# Patient Record
Sex: FEMALE | Race: WHITE | Employment: FULL TIME | URBAN - METROPOLITAN AREA
[De-identification: names, ages, dates, MRNs, and addresses within clinical notes are randomized per-mention and may not be internally consistent; named-entity substitution may affect disease eponyms.]

---

## 2017-03-13 ENCOUNTER — OFFICE VISIT (OUTPATIENT)
Dept: FAMILY MEDICINE CLINIC | Age: 37
End: 2017-03-13
Payer: COMMERCIAL

## 2017-03-13 VITALS
OXYGEN SATURATION: 99 % | WEIGHT: 155 LBS | BODY MASS INDEX: 21.62 KG/M2 | HEART RATE: 58 BPM | SYSTOLIC BLOOD PRESSURE: 100 MMHG | DIASTOLIC BLOOD PRESSURE: 70 MMHG

## 2017-03-13 DIAGNOSIS — R63.5 WEIGHT GAIN: ICD-10-CM

## 2017-03-13 DIAGNOSIS — M25.562 ACUTE PAIN OF LEFT KNEE: Primary | ICD-10-CM

## 2017-03-13 PROCEDURE — 99213 OFFICE O/P EST LOW 20 MIN: CPT | Performed by: FAMILY MEDICINE

## 2017-03-16 LAB
ALBUMIN SERPL-MCNC: 4.6 G/DL
ALP BLD-CCNC: 58 U/L
ALT SERPL-CCNC: 19 U/L
AST SERPL-CCNC: 16 U/L
BASOPHILS ABSOLUTE: 0.1 /ΜL
BASOPHILS RELATIVE PERCENT: 1.3 %
BILIRUB SERPL-MCNC: 0.9 MG/DL (ref 0.1–1.4)
BUN BLDV-MCNC: 16 MG/DL
CALCIUM SERPL-MCNC: 9.2 MG/DL
CHLORIDE BLD-SCNC: 102 MMOL/L
CHOLESTEROL, TOTAL: 152 MG/DL
CHOLESTEROL/HDL RATIO: 2.9
CO2: 30 MMOL/L
CREAT SERPL-MCNC: 0.72 MG/DL
EOSINOPHILS ABSOLUTE: 0.1 /ΜL
EOSINOPHILS RELATIVE PERCENT: 1.9 %
GFR CALCULATED: >60
GLUCOSE BLD-MCNC: 94 MG/DL
HCT VFR BLD CALC: 37.1 % (ref 36–46)
HDLC SERPL-MCNC: 53 MG/DL (ref 35–70)
HEMOGLOBIN: 12.7 G/DL (ref 12–16)
LDL CHOLESTEROL CALCULATED: 94 MG/DL (ref 0–160)
LYMPHOCYTES ABSOLUTE: 1.3 /ΜL
LYMPHOCYTES RELATIVE PERCENT: 28 %
MCH RBC QN AUTO: 30.2 PG
MCHC RBC AUTO-ENTMCNC: 34.3 G/DL
MCV RBC AUTO: 88 FL
MONOCYTES ABSOLUTE: 0.5 /ΜL
MONOCYTES RELATIVE PERCENT: 10.7 %
NEUTROPHILS ABSOLUTE: 2.8 /ΜL
NEUTROPHILS RELATIVE PERCENT: 58.1 %
PDW BLD-RTO: 12.8 %
PLATELET # BLD: 203 K/ΜL
PMV BLD AUTO: 8.9 FL
POTASSIUM SERPL-SCNC: 4 MMOL/L
RBC # BLD: 4.22 10^6/ΜL
SODIUM BLD-SCNC: 140 MMOL/L
TOTAL PROTEIN: 7.5
TRIGL SERPL-MCNC: 26 MG/DL
TSH SERPL DL<=0.05 MIU/L-ACNC: 1.06 UIU/ML
VLDLC SERPL CALC-MCNC: 5 MG/DL
WBC # BLD: 4.8 10^3/ML

## 2017-03-20 DIAGNOSIS — R63.5 WEIGHT GAIN: ICD-10-CM

## 2018-02-14 ENCOUNTER — OFFICE VISIT (OUTPATIENT)
Dept: FAMILY MEDICINE CLINIC | Age: 38
End: 2018-02-14
Payer: COMMERCIAL

## 2018-02-14 VITALS
HEART RATE: 65 BPM | OXYGEN SATURATION: 99 % | BODY MASS INDEX: 22.07 KG/M2 | SYSTOLIC BLOOD PRESSURE: 110 MMHG | WEIGHT: 158.25 LBS | DIASTOLIC BLOOD PRESSURE: 64 MMHG

## 2018-02-14 DIAGNOSIS — K58.2 IRRITABLE BOWEL SYNDROME WITH BOTH CONSTIPATION AND DIARRHEA: ICD-10-CM

## 2018-02-14 DIAGNOSIS — M53.3 SACRAL PAIN: Primary | ICD-10-CM

## 2018-02-14 PROCEDURE — 99214 OFFICE O/P EST MOD 30 MIN: CPT | Performed by: FAMILY MEDICINE

## 2018-02-14 RX ORDER — BACLOFEN 10 MG/1
10 TABLET ORAL 3 TIMES DAILY
Qty: 30 TABLET | Refills: 0 | Status: SHIPPED | OUTPATIENT
Start: 2018-02-14 | End: 2021-06-02

## 2018-02-14 NOTE — PROGRESS NOTES
Subjective:  Burt Barragan presents for   Chief Complaint   Patient presents with   [facility] on ice    Lower Back Pain     since sunday. has applied ice. discomfort constantly. No loc. Is sore. motirn 800mg bid    Irregular bowels  Gassy    Bowels go from constiopation to diarrhea    This has been going on for years    Patient Active Problem List   Diagnosis    Raynaud's disease without gangrene       Review of Systems:  · General: no significant weight changes. · Respiratory: no cough, pleuritic chest pain, dyspnea, or wheezing  · Cardiovascular: no pain, JONES, orthopnea, palpitations, or claudication  · Gastrointestinal: no chronic nausea, vomiting, heartburn, diarrhea, constipation, bloating, or abdominal pain. No bloody or black stools. Objective:  Physical Exam   Vitals:   Vitals:    02/14/18 1453   BP: 110/64   Pulse: 65   SpO2: 99%   Weight: 158 lb 4 oz (71.8 kg)     Wt Readings from Last 3 Encounters:   02/14/18 158 lb 4 oz (71.8 kg)   03/13/17 155 lb (70.3 kg)   08/15/16 151 lb (68.5 kg)     Ht Readings from Last 3 Encounters:   12/10/15 5' 11\" (1.803 m)   07/28/15 6' (1.829 m)   04/07/15 6' (1.829 m)     Body mass index is 22.07 kg/m². Constitutional: She is oriented to person, place, and time. She appears well-developed and well-nourished and in no acute distress. Answers all my questions appropriately. Head: Normocephalic and atraumatic. Eyes:conjunctiva appear normal.  Nose: pink, non-edematous mucosa. No polyps. No septal deviation  Throat: no erythema, tonsillar hypertrophy or exudate. No ulcerations noted. Lips/Teeth/Gums all appear normal.  Neck: Normal range of motion. Neck supple. No tracheal deviation present. No abnormal lymphadenopathy. No JVD noted. Carotids are clear bilaterally. No thyroid masses noted. Abdomen: No distension noted.  + bowel sounds in all quadrants which are normoactive. No bruits noted. No masses could be palpated.   No unusual

## 2018-02-14 NOTE — PROGRESS NOTES
Visit Information    Have you changed or started any medications since your last visit including any over-the-counter medicines, vitamins, or herbal medicines? no   Have you stopped taking any of your medications? Is so, why? -  no  Are you having any side effects from any of your medications? - no    Have you seen any other physician or provider since your last visit?  no   Have you had any other diagnostic tests since your last visit?  no   Have you been seen in the emergency room and/or had an admission in a hospital since we last saw you?  no   Have you had your routine dental cleaning in the past 6 months? No    Do you have an active MyChart account? If no, what is the barrier?   Yes    Patient Care Team:  Shona Flores MD as PCP - General (Family Medicine)    Medical History Review  Past Medical, Family, and Social History reviewed and  contribute to the patient presenting condition    Health Maintenance   Topic Date Due    HIV screen  07/23/1994    DTaP/Tdap/Td vaccine (1 - Tdap) 07/23/1998    Cervical cancer screen  07/23/2000    Flu vaccine (1) 09/01/2017

## 2018-02-14 NOTE — PATIENT INSTRUCTIONS
slowly. Try to make mealtime relaxing. · Find ways to reduce stress. · Get at least 30 minutes of exercise on most days of the week. Exercise can help reduce tension and prevent constipation. Walking is a good choice. You also may want to do other activities, such as running, swimming, cycling, or playing tennis or team sports. When should you call for help? Call your doctor now or seek immediate medical care if:  ? · Your pain is different than usual or occurs with fever. ? · You lose weight without trying, or you lose your appetite and you do not know why.   ? · Your symptoms often wake you from sleep. ? · Your stools are black and tarlike or have streaks of blood. ? Watch closely for changes in your health, and be sure to contact your doctor if:  ? · Your IBS symptoms get worse or begin to disrupt your day-to-day life. ? · You become more tired than usual.   ? · Your home treatment stops working. Where can you learn more? Go to https://Identropy.TYMR. org and sign in to your [employer] account. Enter G665 in the [employer] box to learn more about \"Irritable Bowel Syndrome: Care Instructions. \"     If you do not have an account, please click on the \"Sign Up Now\" link. Current as of: May 12, 2017  Content Version: 11.5  © 6139-7949 [employer]. Care instructions adapted under license by [facility] ([facility]). If you have questions about a medical condition or this instruction, always ask your healthcare professional. [place] [address] any warranty or liability for your use of this information. Patient Education        Diet for Irritable Bowel Syndrome: Care Instructions  Your Care Instructions    Irritable bowel syndrome, or IBS, is a problem with the intestines. IBS can cause belly pain, bloating, gas, constipation, and diarrhea. Most people can control their symptoms by changing their diet and easing stress.   No specific foods cause everyone with drinks high in sugar, especially fruit juice, soda, candy, and other packaged sweets (such as cookies)  · Foods high in fat, including miner, sausage, butter, oils, and anything deep-fried  · Sorbitol and xylitol, artificial sweeteners found in some sugarless candies and chewing gum  Keep track of foods  · Some people with IBS use a daily food diary to keep track of what they eat and whether they have any symptoms after eating certain foods. The diary also can be a good way to record what is going on in your life. · Stress plays a role in IBS. So if you are aware that certain stresses bring on symptoms, you can try to reduce those stresses. Keep mealtimes pleasant  · Try to maintain a pleasant environment when you eat. This may reduce stress that can make symptoms likely to occur. · Give yourself plenty of time to eat, rather than eating on the go. Chew your food slowly. Try not to swallow air, which can cause bloating. Where can you learn more? Go to https://[employer].Vysr. org and sign in to your [facility] account. Enter Z773 in the InMyShow box to learn more about \"Diet for Irritable Bowel Syndrome: Care Instructions. \"     If you do not have an account, please click on the \"Sign Up Now\" link. Current as of: May 12, 2017  Content Version: 11.5  © 1712-9993 [employer]. Care instructions adapted under license by [facility] [place] ([facility]). If you have questions about a medical condition or this instruction, always ask your healthcare professional. [place] [address] any warranty or liability for your use of this information.

## 2021-04-23 ENCOUNTER — TELEPHONE (OUTPATIENT)
Dept: FAMILY MEDICINE CLINIC | Age: 41
End: 2021-04-23

## 2021-06-02 ENCOUNTER — OFFICE VISIT (OUTPATIENT)
Dept: FAMILY MEDICINE CLINIC | Age: 41
End: 2021-06-02
Payer: COMMERCIAL

## 2021-06-02 VITALS
OXYGEN SATURATION: 99 % | DIASTOLIC BLOOD PRESSURE: 70 MMHG | SYSTOLIC BLOOD PRESSURE: 116 MMHG | HEIGHT: 72 IN | BODY MASS INDEX: 24.24 KG/M2 | WEIGHT: 179 LBS | HEART RATE: 76 BPM

## 2021-06-02 DIAGNOSIS — Z87.898 HISTORY OF BENZODIAZEPINE USE: ICD-10-CM

## 2021-06-02 DIAGNOSIS — M25.562 CHRONIC PAIN OF LEFT KNEE: ICD-10-CM

## 2021-06-02 DIAGNOSIS — F41.9 ANXIETY: ICD-10-CM

## 2021-06-02 DIAGNOSIS — I73.00 RAYNAUD'S DISEASE WITHOUT GANGRENE: Primary | ICD-10-CM

## 2021-06-02 DIAGNOSIS — G89.29 CHRONIC PAIN OF LEFT KNEE: ICD-10-CM

## 2021-06-02 DIAGNOSIS — Z12.31 ENCOUNTER FOR SCREENING MAMMOGRAM FOR MALIGNANT NEOPLASM OF BREAST: ICD-10-CM

## 2021-06-02 PROCEDURE — 99215 OFFICE O/P EST HI 40 MIN: CPT | Performed by: FAMILY MEDICINE

## 2021-06-02 RX ORDER — UBROGEPANT 50 MG/1
TABLET ORAL
Qty: 10 TABLET | Refills: 11 | Status: SHIPPED | OUTPATIENT
Start: 2021-06-02

## 2021-06-02 RX ORDER — ALPRAZOLAM 0.5 MG/1
0.5 TABLET ORAL 3 TIMES DAILY PRN
Qty: 30 TABLET | Refills: 1 | Status: SHIPPED | OUTPATIENT
Start: 2021-06-02 | End: 2021-07-02

## 2021-06-02 RX ORDER — NIFEDIPINE 30 MG/1
30 TABLET, EXTENDED RELEASE ORAL DAILY
Qty: 30 TABLET | Refills: 5 | Status: SHIPPED
Start: 2021-06-02 | End: 2022-04-22

## 2021-06-02 SDOH — ECONOMIC STABILITY: FOOD INSECURITY: WITHIN THE PAST 12 MONTHS, YOU WORRIED THAT YOUR FOOD WOULD RUN OUT BEFORE YOU GOT MONEY TO BUY MORE.: NEVER TRUE

## 2021-06-02 SDOH — ECONOMIC STABILITY: FOOD INSECURITY: WITHIN THE PAST 12 MONTHS, THE FOOD YOU BOUGHT JUST DIDN'T LAST AND YOU DIDN'T HAVE MONEY TO GET MORE.: NEVER TRUE

## 2021-06-02 ASSESSMENT — PATIENT HEALTH QUESTIONNAIRE - PHQ9
1. LITTLE INTEREST OR PLEASURE IN DOING THINGS: 0
SUM OF ALL RESPONSES TO PHQ QUESTIONS 1-9: 0
SUM OF ALL RESPONSES TO PHQ9 QUESTIONS 1 & 2: 0
SUM OF ALL RESPONSES TO PHQ QUESTIONS 1-9: 0
2. FEELING DOWN, DEPRESSED OR HOPELESS: 0
SUM OF ALL RESPONSES TO PHQ QUESTIONS 1-9: 0

## 2021-06-02 ASSESSMENT — SOCIAL DETERMINANTS OF HEALTH (SDOH): HOW HARD IS IT FOR YOU TO PAY FOR THE VERY BASICS LIKE FOOD, HOUSING, MEDICAL CARE, AND HEATING?: NOT HARD AT ALL

## 2021-06-02 NOTE — LETTER
CONTROLLED SUBSTANCE MEDICATION AGREEMENT     Patient Name: Nelsy Elder  Patient YOB: 1980   I understand, that controlled substance medications may be used to help better manage my symptoms and to improve my ability to function at home, work and in social settings. However, I also understand that these medications do have risks, which have been discussed with me, including possible development of physical or psychological dependence. I understand that successful treatment requires mutual trust and honesty between me and my provider. I understand and agree that following this Medication Agreement is necessary in continuing my provider-patient relationship and the success of my treatment plan. Explanation from my Provider: Benefits and Goals of Controlled Substance Medications: There are two potential goals for your treatment: (1) decreased pain and suffering (2) improved daily life functions. There are many possible treatments for your chronic condition(s). Alternatives such as physical therapy, yoga, massage, home daily exercise, meditation, relaxation techniques, injections, chiropractic manipulations, surgery, cognitive therapy, hypnosis and many medications that are not habit-forming may be used. Use of controlled substance medications may be helpful, but they are unlikely to resolve all symptoms or restore all function. Explanation from my Provider: Risks of Controlled Substance Medications:  Opioid pain medications: These medications can lead to problems such as addiction/dependence, sedation, lightheadedness/dizziness, memory issues, falls, constipation, nausea, or vomiting. They may also impair the ability to drive or operate machinery. Additionally, these medications may lower testosterone levels, leading to loss of bone strength, stamina and sex drive.   They may cause problems with breathing, sleep apnea and reduced coughing, which is especially dangerous for patients with lung disease. Overdose or dangerous interactions with alcohol and other medications may occur, leading to death. Hyperalgesia may develop, which means patients receiving opioids for the treatment of pain may become more sensitive to certain painful stimuli, and in some cases, experience pain from ordinarily non-painful stimuli. Women between the ages of 14-53 who could become pregnant should carefully weigh the risks and benefits of opioids with their physicians, as these medications increase the risk of pregnancy complications, including miscarriage,  delivery and stillbirth. It is also possible for babies to be born addicted to opioids. Opioid dependence withdrawal symptoms may include; feelings of uneasiness, increased pain, irritability, belly pain, diarrhea, sweats and goose-flesh. Benzodiazepines and non-benzodiazepine sleep medications: These medications can lead to problems such as addiction/dependence, sedation, fatigue, lightheadedness, dizziness, incoordination, falls, depression, hallucinations, and impaired judgment, memory and concentration. The ability to drive and operate machinery may also be affected. Abnormal sleep-related behaviors have been reported, including sleepwalking, driving, making telephone calls, eating, or having sex while not fully awake. These medications can suppress breathing and worsen sleep apnea, particularly when combined with alcohol or other sedating medications, potentially leading to death. Dependence withdrawal symptoms may include tremors, anxiety, hallucinations and seizures. Stimulants:  Common adverse effects include addiction/dependence, increased blood  pressure and heart rate, decreased appetite, nausea, involuntary weight loss, insomnia,                                                                                                                     Initials:_______   irritability, and headaches.   These risks may increase when these medications are combined with other stimulants, such as caffeine pills or energy drinks, certain weight loss supplements and oral decongestants. Dependence withdrawal symptoms may include depressed mood, loss of interest, suicidal thoughts, anxiety, fatigue, appetite changes and agitation. Testosterone replacement therapy:  Potential side effects include increased risk of stroke and heart attack, blood clots, increased blood pressure, increased cholesterol, enlarged prostate, sleep apnea, irritability/aggression and other mood disorders, and decreased fertility. I agree and understand that I and my prescriber have the following rights and responsibilities regarding my treatment plan:     1. MY RIGHTS:  To be informed of my treatment and medication plan. To be an active participant in my health and wellbeing. 2. MY RESPONSIBILITY AND UNDERSTANDING FOR USE OF MEDICATIONS   I will take medications at the dose and frequency as directed. For my safety, I will not increase or change how I take my medications without the recommendation of my healthcare provider.  I will actively participate in any program recommended by my provider which may improve function, including social, physical, psychological programs.  I will not take my medications with alcohol or other drugs not prescribed to me. I understand that drinking alcohol with my medications increases the chances of side effects, including reduced breathing rate and could lead to personal injury when operating machinery.  I understand that if I have a history of substance use disorders, including alcohol or other illicit drugs, that I may be at increased risk of addiction to my medications.  I agree to notify my provider immediately if I should become pregnant so that my treatment plan can be adjusted.    I agree and understand that I shall only receive controlled substance medications from the prescriber that signed this agreement unless there is [email or web address]    5. MY RESPONSIBILITY WITH ILLEGAL DRUGS    I will not use illegal or street drugs or another person's prescription medications not prescribed to me.  If there are identified addiction type symptoms, then referral to a program may be provided by my provider and I agree to follow through with this recommendation. 6. MY RESPONSIBILITY FOR COOPERATION WITH INVESTIGATIONS   I understand that my provider will comply with any applicable law and may discuss my use and/or possible misuse/abuse of controlled substances and alcohol, as appropriate, with any health care provider involved in my care, pharmacist, or legal authority.  I authorize my provider and pharmacy to cooperate fully with law enforcement agencies (as permitted by law) in the investigation of any possible misuse, sale, or other diversion of my controlled substances.  I agree to waive any applicable privilege or right of privacy or confidentiality with respect to these authorizations. 7. PROVIDERS RIGHT TO MONITOR FOR SAFETY: PRESCRIPTION MONITORING / DRUG TESTING   I consent to drug/toxicology screening and will submit to a drug screen upon my providers request to assure I am only taking the prescribed drugs for my safety monitoring. I understand that a drug screen is a laboratory test in which a sample of my urine, blood or saliva is checked to see what drugs I have been taking. This may entail an observed urine specimen, which means that a nurse or other health care provider may watch me provide urine, and I will cooperate if I am asked to provide an observed specimen.  I understand that my provider will check a copy of my State Prescription Monitoring Program () Report in order to safely prescribe medications.  Pill Counts: I consent to pill counts when requested.   I may be asked to bring all my prescribed controlled substance medications, in their original bottles, to all of my scheduled appointments. In addition, my provider may ask me to come to the practice at any time for a random pill count. 8. TERMINATION OF THIS AGREEMENT  For my safety, my prescriber has the right to stop prescribing controlled substance medications and may end this agreement. Initials:_______   Conditions that may result in termination of this agreement:  a. I do not show any improvement in pain, or my activity has not improved. b. I develop rapid tolerance or loss of improvement, as described in my treatment plan.  c. I develop significant side effects from the medication. d. My behavior is not consistent with the responsibilities outlined above, thereby causing safety concerns to continue prescribing controlled substance medications. e. I fail to follow the terms of this agreement. f. Other:____________________________       UNDERSTANDING THIS MEDICATION AGREEMENT:    I have read the above and have had all my questions answered. For chronic disease management, I know that my symptoms can be managed with many types of treatments. A chronic medication trial may be part of my treatment, but I must be an active participant in my care. Medication therapy is only one part of my symptom management plan. In some cases, there may be limited scientific evidence to support the chronic use of certain medications to improve symptoms and daily function. Furthermore, in certain circumstances, there may be scientific information that suggests that the use of chronic controlled substances may worsen my symptoms and increase my risk of unintentional death directly related to this medication therapy. I know that if my provider feels my risk from controlled medications is greater than my benefit, I will have my controlled substance medication(s) compassionately lowered or removed altogether.      I further agree to allow this office to contact my HIPAA contact if there are concerns about my safety and use of the controlled medications. I have agreed to use the prescribed controlled substance medications to me as instructed by my provider and as stated in this Medication Agreement. My initial on each page and my signature below shows that I have read each page and I have had the opportunity to ask questions with answers provided by my provider.     Patient Name (Printed): _____________________________________  Patient Signature:  ______________________   Date: _____________    Prescriber Name (Printed): ___________________________________  Prescriber Signature: _____________________  Date: _____________

## 2021-06-02 NOTE — PROGRESS NOTES
Subjective:  Delano Garcia presents for   Chief Complaint   Patient presents with    Anxiety     needs refill of xanax     Dad had MI at age of 59  has flight anxiety and that is the only tmie she uses the xanax    No exercise     Has left knee discomfort she points to the tibial tubosity. Worse with stairs  and sqauats  raynauds seems to be getting worse over time - easier to trigger. Has spider veins on her legs. Headaches aren't being helped much with the triptins. Patient Active Problem List   Diagnosis    Raynaud's disease without gangrene         Review of Systems:  · General: no significant weight changes. · Respiratory: no cough, pleuritic chest pain, dyspnea, or wheezing  · Cardiovascular: no pain, JONES, orthopnea, palpitations or claudication  · Gastrointestinal: no chronic nausea, vomiting, heartburn, diarrhea, constipation, bloating, or abdominal pain. No bloody or black stools. Objective:  Physical Exam   Vitals:   Vitals:    06/02/21 1438   BP: 116/70   Pulse: 76   SpO2: 99%   Weight: 179 lb (81.2 kg)   Height: 6' (1.829 m)     Wt Readings from Last 3 Encounters:   06/02/21 179 lb (81.2 kg)   02/14/18 158 lb 4 oz (71.8 kg)   03/13/17 155 lb (70.3 kg)     Ht Readings from Last 3 Encounters:   06/02/21 6' (1.829 m)   12/10/15 5' 11\" (1.803 m)   07/28/15 6' (1.829 m)     Body mass index is 24.28 kg/m². Constitutional: She is oriented to person, place, and time. She appears well-developed and well-nourished and in no acute distress. Answers all my questions appropriately. Head: Normocephalic and atraumatic. Eyes:conjunctiva appear normal.      Mild palpable tenderness over tibial turbosity. From. With squats she complains of increased discomfort. She does have small spider veins on her distal LE. Skin is in good condition. Hands  -  Look good. Vascular exam is wnl    Assessment:   Encounter Diagnoses   Name Primary?     History of benzodiazepine use     Anxiety     Encounter for screening mammogram for malignant neoplasm of breast     Raynaud's disease without gangrene Yes    Chronic pain of left knee          Plan:   Medications Discontinued During This Encounter   Medication Reason    rizatriptan (MAXALT-MLT) 10 MG disintegrating tablet     baclofen (LIORESAL) 10 MG tablet LIST CLEANUP    ranitidine (ZANTAC) 150 MG capsule     ALPRAZolam (XANAX) 0.5 MG tablet REORDER     THE ABOVE NOTED DISCONTINUED MEDS MAY ONLY BE FROM 'CLEANING UP' THE MED LIST AND WERE NOT ACTUALLY CANCELED;  SEE CHART FOR DETAILS! Orders Placed This Encounter   Medications    NIFEdipine (PROCARDIA XL) 30 MG extended release tablet     Sig: Take 1 tablet by mouth daily     Dispense:  30 tablet     Refill:  5    ALPRAZolam (XANAX) 0.5 MG tablet     Sig: Take 1 tablet by mouth 3 times daily as needed for Anxiety (flight) for up to 30 days. Dispense:  30 tablet     Refill:  1     Orders Placed This Encounter   Procedures    DAVID DIGITAL SCREEN W OR WO CAD BILATERAL     Standing Status:   Future     Standing Expiration Date:   6/2/2022     Order Specific Question:   Reason for exam:     Answer:   See ICDM-10 code attached   [address], PAIN     Standing Status:   Future     Standing Expiration Date:   6/2/2022    CBC Auto Differential     Standing Status:   Future     Standing Expiration Date:   6/2/2022    Comprehensive Metabolic Panel     Standing Status:   Future     Standing Expiration Date:   6/2/2022    Lipid Panel     Standing Status:   Future     Standing Expiration Date:   6/2/2022     Order Specific Question:   Is Patient Fasting?/# of Hours     Answer:   0    TSH with Reflex     Standing Status:   Future     Standing Expiration Date:   6/2/2022     Return in about 3 months (around 9/2/2021). Patient Instructions   voltaren gel 4 x a day    Data Unavailable      After the patient left, I reviewed med options for her and have prescribed ubrelvy.     Sent a [employer] e message to her on her answering machine

## 2021-06-14 DIAGNOSIS — Z87.898 HISTORY OF BENZODIAZEPINE USE: ICD-10-CM

## 2021-06-14 DIAGNOSIS — F41.9 ANXIETY: ICD-10-CM

## 2021-06-21 ENCOUNTER — APPOINTMENT (OUTPATIENT)
Dept: GENERAL RADIOLOGY | Age: 41
End: 2021-06-21
Payer: COMMERCIAL

## 2021-06-21 ENCOUNTER — HOSPITAL ENCOUNTER (EMERGENCY)
Age: 41
Discharge: HOME | End: 2021-06-21
Attending: EMERGENCY MEDICINE
Payer: COMMERCIAL

## 2021-06-21 ENCOUNTER — NURSE TRIAGE (OUTPATIENT)
Dept: OTHER | Facility: CLINIC | Age: 41
End: 2021-06-21

## 2021-06-21 VITALS
BODY MASS INDEX: 23.7 KG/M2 | TEMPERATURE: 98.6 F | OXYGEN SATURATION: 99 % | SYSTOLIC BLOOD PRESSURE: 123 MMHG | WEIGHT: 175 LBS | HEIGHT: 72 IN | HEART RATE: 68 BPM | DIASTOLIC BLOOD PRESSURE: 84 MMHG | RESPIRATION RATE: 14 BRPM

## 2021-06-21 DIAGNOSIS — S92.512A CLOSED DISPLACED FRACTURE OF PROXIMAL PHALANX OF LESSER TOE OF LEFT FOOT, INITIAL ENCOUNTER: Primary | ICD-10-CM

## 2021-06-21 PROCEDURE — 6370000000 HC RX 637 (ALT 250 FOR IP): Performed by: PHYSICIAN ASSISTANT

## 2021-06-21 PROCEDURE — 73630 X-RAY EXAM OF FOOT: CPT

## 2021-06-21 PROCEDURE — 99283 EMERGENCY DEPT VISIT LOW MDM: CPT

## 2021-06-21 RX ORDER — ACETAMINOPHEN 500 MG
1000 TABLET ORAL ONCE
Status: COMPLETED | OUTPATIENT
Start: 2021-06-21 | End: 2021-06-21

## 2021-06-21 RX ADMIN — ACETAMINOPHEN 1000 MG: 500 TABLET ORAL at 18:53

## 2021-06-21 ASSESSMENT — PAIN DESCRIPTION - DESCRIPTORS: DESCRIPTORS: THROBBING;TENDER

## 2021-06-21 ASSESSMENT — PAIN SCALES - GENERAL
PAINLEVEL_OUTOF10: 8
PAINLEVEL_OUTOF10: 8

## 2021-06-21 ASSESSMENT — PAIN DESCRIPTION - LOCATION: LOCATION: FOOT

## 2021-06-21 ASSESSMENT — PAIN DESCRIPTION - ORIENTATION: ORIENTATION: LEFT

## 2021-06-21 NOTE — ED PROVIDER NOTES
[address] [facility]  [address] [facility] [address]  Phone: [phone]  Fax: [phone]      eMERGENCY dEPARTMENT eNCOUnter      Pt Name: Autumn Wells  MRN: [id number]  [place] 1980  Date of evaluation: 6/21/21      CHIEF COMPLAINT:  Chief Complaint   Patient presents with    Foot Injury       HISTORY OF PRESENT ILLNESS    Autumn Wells is a 38 y.o. female who presents with lower leg pain:    Location/Symptom:   LEFT foot pain   Timing/Onset: 1 day  Context/Setting:   Painful/swollen/bruised left 5th toe and lateral foot after stubbing toe on her kid's toy. No paresthesias or focal weakness. RICE since then. Quality: achy, sharp  Duration: constant  Modifying Factors: worse with movement/walking, better with rest  Severity: moderate    Nursing Notes were reviewed. REVIEW OF SYSTEMS       Constitutional: Denies recent fever, chills. Eyes: No visual changes. Neck: No neck pain. Respiratory: Denies recent shortness of breath. Cardiac:  Denies recent chest pain. GI:  Denies abdominal pain/nausea/vomiting/diarrhea. : Denies dysuria. Musculoskeletal:  Per HPI  Neurologic: Denies headache or focal weakness/paresthesias  Skin:  Denies any rash. Negative in 10 essential Systems except as mentioned above and in the HPI. PAST MEDICAL HISTORY   PMH:  has no past medical history on file. Surgical History:  has a past surgical history that includes hip surgery (Right, 1/5/2016). Social History:  reports that she has never smoked. She has never used smokeless tobacco. She reports current alcohol use. She reports that she does not use drugs. Family History: None  Psychiatric History: None    Allergies:is allergic to bactrim and pcn [penicillins].       PHYSICAL EXAM     INITIAL VITALS: /84   Pulse 68   Temp 98.6 °F (37 °C) (Oral)   Resp 14   Ht 6' (1.829 m)   Wt 79.4 kg (175 lb)   SpO2 99%   BMI 23.73 kg/m²   Constitutional:  Well developed   Eyes: Pupils equal/round  HENT:  Atraumatic, external ears normal, nose normal  Respiratory:  Comfortable speech and breathing  Musculoskeletal:    Generalized mild edema of left 5th toe and ecchymosis. Little ROM due to pain. TTP of lateral mid/distal 5th MT but no edema/ecchymosis. Remainder of left toes and foot wnl. Remainder of LLE wnl. No other TTP/signs of trauma to remainder of lower extremities. NV intact distally. Back:  Normal to inspection. Integument:  No rash. Neurologic:  Alert & age appropriate mentation/interaction, no focal deficits noted       DIAGNOSTIC RESULTS     EKG: All EKG's are interpreted by the Emergency Department Physician who either signs or Co-signs this chart in the absence of a cardiologist.  Not indicated    RADIOLOGY:   Reviewed the radiologist:  XR FOOT LEFT (MIN 3 VIEWS)   Final Result   1. Minimally displaced obliquely oriented fracture, involving the shaft of   the proximal phalanx left 5th toe                 LABS:  Labs Reviewed - No data to display  Not indicated    EMERGENCY DEPARTMENT COURSE:     2052  Tylenol and left Foot XR, possible broken 5th toe. No deformity/deficits. 1947  Minimally displaced toe fracture. Giving Orthotic boot and Foot/Ankle Ortho follow up here in [address] [place] [address]. Attending discharged this patient after discussing all labwork/imaging results that were finalized. Treatment plan and recommended follow-up discussed with them as well. Orders Placed This Encounter   Medications    acetaminophen (TYLENOL) tablet 1,000 mg       CONSULTS:  None      FINAL IMPRESSION      1.  Closed displaced fracture of proximal phalanx of lesser toe of left foot, initial encounter          DISPOSITION/PLAN:  DISPOSITION Decision To Discharge 06/21/2021 07:45:09 PM        PATIENT REFERRED TO:  Steve Cancino [phone] [id number] [phone]    Schedule an appointment as soon as possible for a visit   For fracture

## 2021-06-21 NOTE — ED PROVIDER NOTES
[address] [facility] ED  [address] [facility] [address]  Phone: [phone]  Fax: [phone]      Attending Physician Attestation    I performed a history and physical examination of the patient and discussed management with the mid level provider. I reviewed the mid level provider's note and agree with the documented findings and plan of care. Any areas of disagreement are noted on the chart. I was personally present for the key portions of any procedures. I have documented in the chart those procedures where I was not present during the key portions. I have reviewed the emergency nurses triage note. I agree with the chief complaint, past medical history, past surgical history, allergies, medications, social and family history as documented unless otherwise noted below. Documentation of the HPI, Physical Exam and Medical Decision Making performed by mid level providers is based on my personal performance of the HPI, PE and MDM. For Physician Assistant/ Nurse Practitioner cases/documentation I have personally evaluated this patient and have completed at least one if not all key elements of the E/M (history, physical exam, and MDM). Additional findings are as noted. CHIEF COMPLAINT       Chief Complaint   Patient presents with    Foot Injury         HISTORY OF PRESENT ILLNESS    Nelsy Elder is a 38 y.o. female who presents for evaluation of left foot pain. The patient reports that yesterday she accidentally stubbed her left fifth toe on a toy. She developed sudden onset of sharp, stabbing pain to her left fifth toe that radiates into her left foot. She has developed associated bruising and swelling over the past day. The patient denies any previous injury or surgery to her left foot. She has been taking ibuprofen with some improvement.   Her pain is worse with ambulation and better at rest.  The patient denies fever, chills, headache, vision changes, neck pain, back pain, chest pain, shortness of breath, abdominal pain, urinary/bowel symptoms, hip pain, knee pain, ankle pain, or recent illness. PAST MEDICAL HISTORY    has no past medical history on file. SURGICAL HISTORY      has a past surgical history that includes hip surgery (Right, 1/5/2016). CURRENT MEDICATIONS       Discharge Medication List as of 6/21/2021  7:47 PM      CONTINUE these medications which have NOT CHANGED    Details   NIFEdipine (PROCARDIA XL) 30 MG extended release tablet Take 1 tablet by mouth daily, Disp-30 tablet, R-5Normal      ALPRAZolam (XANAX) 0.5 MG tablet Take 1 tablet by mouth 3 times daily as needed for Anxiety (flight) for up to 30 days. , Disp-30 tablet, R-1Normal      Ubrogepant (UBRELVY) 50 MG TABS Use 50mg at onset of headache. May repeat 2 hours later, Disp-10 tablet, R-11Normal             ALLERGIES     is allergic to bactrim and pcn [penicillins]. FAMILY HISTORY     has no family status information on file. family history is not on file. SOCIAL HISTORY      reports that she has never smoked. She has never used smokeless tobacco. She reports current alcohol use. She reports that she does not use drugs. PHYSICAL EXAM     INITIAL VITALS:  height is 6' (1.829 m) and weight is 79.4 kg (175 lb). Her oral temperature is 98.6 °F (37 °C). Her blood pressure is 123/84 and her pulse is 68. Her respiration is 14 and oxygen saturation is 99%. DP/PT pulses 2+/4 and equal bilaterally. Strength 5/5 with dorsi and plantar flexion of the bilateral feet. Normal sensation to the bilateral feet. Able to ambulate with a limp. She has tenderness palpation, ecchymosis and edema noted to the left fifth phalanx. No subungual hematoma. No tenderness over the left knee, left ankle, proximal fibular head, or Achilles tendon. Achilles tendon is intact. There is tenderness over the base of the fifth metatarsal on the left.       DIAGNOSTIC RESULTS     EKG: All EKG's are interpreted by ibuprofen or Tylenol as needed for pain and to follow-up with podiatry in 1 to 2 days. I instructed her to have her left foot lor-rayed if the pain to the base of her fifth metatarsal does not improve. I have low concern for compartment syndrome or infection. I instructed her to return to the ER for worsening symptoms or any other concern. The patient understands that at this time there is no evidence for a more malignant underlying process, but also understands that early in the process of an illness or injury, an emergency department work-up can be falsely reassuring. Routine discharge counseling was given, and the patient understands that worsening, changing or persistent symptoms should prompt a immediate call or follow-up with their primary care physician or return to the emergency department. The importance of appropriate follow-up was also discussed. I have reviewed the disposition diagnosis with the patient. I have answered their questions and given discharge instructions. They voiced understanding of these instructions and did not have any further questions or complaints.       (Please note that portions of this note were completed with a voice recognition program.  Efforts were made to edit the dictations but occasionally words are mis-transcribed.)    Marcello Abreu DO, DO  Attending Emergency Medicine Physician       Marcello Abreu DO  06/22/21 [id number]

## 2021-06-21 NOTE — TELEPHONE ENCOUNTER
Patient called pre-service center [facility] with red flag complaint. Brief description of triage: Janis Nath states that she tripped yesterday and feels that she may have broken her pinkie toe on the left and also wants to make sure she did not fracture outer aspect of foot. Please see triage below for more detailed information. Triage indicates for patient to be seen today    Care advice provided, patient verbalizes understanding; denies any other questions or concerns; instructed to call back for any new or worsening symptoms. Writer provided warm transfer to [facility] for appointment scheduling. Attention Provider: Thank you for allowing me to participate in the care of your patient. The patient was connected to triage in response to information provided to the [facility]. Please do not respond through this encounter as the response is not directed to a shared pool. Reason for Disposition   SEVERE pain (e.g., excruciating)    Answer Assessment - Initial Assessment Questions  1. MECHANISM: \"How did the injury happen? \"       Tripped on something    2. ONSET: \"When did the injury happen? \" (Minutes or hours ago)       Yesterday    3. LOCATION: \"What part of the toe is injured? \" \"Is the nail damaged? \"       Left foot, pinkie toe    4. APPEARANCE of TOE INJURY: \"What does the injury look like? \"       Bruised and swollen    5. SEVERITY: \"Can you use the foot normally? \" \"Can you walk? \"       Hard to walk because of the pain    6. SIZE: For cuts, bruises, or swelling, ask: \"How large is it? \" (e.g., inches or centimeters;  entire toe)       NA    7. PAIN: \"Is there pain? \" If so, ask: \"How bad is the pain? \"   (e.g., Scale 1-10; or mild, moderate, severe)      Severe    8. TETANUS: For any breaks in the skin, ask: \"When was the last tetanus booster? \"      NA    9. DIABETES: \"Do you have a history of diabetes or poor circulation in the feet? \"      No    10. OTHER SYMPTOMS: \"Do you have any other symptoms? \" No    11. PREGNANCY: \"Is there any chance you are pregnant? \" \"When was your last menstrual period? \"        No, IUD    Protocols used: TOE INJURY-ADULT-OH

## 2021-06-24 DIAGNOSIS — M79.672 LEFT FOOT PAIN: Primary | ICD-10-CM

## 2021-06-28 ENCOUNTER — OFFICE VISIT (OUTPATIENT)
Dept: ORTHOPEDIC SURGERY | Age: 41
End: 2021-06-28
Payer: COMMERCIAL

## 2021-06-28 VITALS — RESPIRATION RATE: 12 BRPM | WEIGHT: 175 LBS | BODY MASS INDEX: 23.7 KG/M2 | HEIGHT: 72 IN

## 2021-06-28 DIAGNOSIS — S92.512A CLOSED DISPLACED FRACTURE OF PROXIMAL PHALANX OF LESSER TOE OF LEFT FOOT, INITIAL ENCOUNTER: Primary | ICD-10-CM

## 2021-06-28 PROCEDURE — 99203 OFFICE O/P NEW LOW 30 MIN: CPT | Performed by: ORTHOPAEDIC SURGERY

## 2021-06-28 NOTE — PROGRESS NOTES
status: Never Smoker    Smokeless tobacco: Never Used   Substance and Sexual Activity    Alcohol use: Yes     Comment: rare    Drug use: No    Sexual activity: Not on file     Occupation: Full-time      OBJECTIVE:  Resp 12   Ht 6' (1.829 m)   Wt 175 lb (79.4 kg)   BMI 23.73 kg/m²    Psych: alert and oriented to person, time, and place   Cardio:  well perfused extremities, no cyanosis   Resp:  normal respiratory effort  Musculoskeletal:    Affected lower extremity:    Vascular: Limb well perfused, compartments soft/compressible. Skin: No erythema/ulcers. Intact. Neurovascular Status:  Grossly neurovascularly intact throughout  Motion:  Grossly able to fire major muscle groups with appropriate/expected AROM  Tenderness to Palpation: Small toe  -Slight external rotation of the small toe, no impinging/overlapping with fourth digit      RADIOLOGY:   6/28/2021 FINDINGS:  Three views (AP, Oblique, Lateral) of the left foot were obtained in the office today and reviewed, revealing mildly displaced oblique left small toe proximal phalanx fracture with minimal shortening. IMPRESSION: Small toe fracture as above. Electronically signed by Jazzmine Desir MD      Relevant previous imaging reviewed, both imaging and report(s) as below:    XR FOOT LEFT (MIN 3 VIEWS)    Result Date: 6/21/2021  1. Minimally displaced obliquely oriented fracture, involving the shaft of the proximal phalanx left 5th toe        ASSESSMENT AND PLAN:  Body mass index is 23.73 kg/m². She has a left small toe proximal phalanx fracture, sustained on 6/21/2021. Notably, she has no relevant past medical history. We had a discussion today about the likely diagnosis and its natural history, physical exam and imaging findings, as well as various treatment options in detail. Surgically, we discussed a possible left small toe CRPP versus ORIF.   We discussed the possibility of shortening, as well as her slight rotational deformity, however, as her toes not impinging on her fourth and is not deviated laterally, I do not believe that the current position will bother her, and she agrees. We did discuss the possibility and risks of displacement. At today's visit, we decided to proceed with conservative management for the time being. Orders/referrals were placed as below at today's visit. The patient will avoid the routine use of NSAIDs to prevent the theoretical risk of delayed healing. The patient will avoid pain provoking activity. She was placed into a wedge cast shoe, and her fifth toe was david taped to her fourth toe. She may weight-bear as tolerated. All questions were answered and the above plan was agreed upon. The patient will return to clinic in 1 week with repeat left foot x-rays. At her next visit, we will review her x-rays and monitor for displacement, and possibly consider surgery again as above. At the patient's next visit, depending on how the patient is doing and/or new imaging/labs results, we may consider the following options:    []  Lace up ankle     []  CAM boot         []  removable wrist brace     []  PT:        []  Wean out immobilization         []  Adv activity      []  Footmind        []  Spenco       []  Custom Orthotic:               []  AZ brace                    []  Rocker Bottom      []  Night splint    []  Heel cups        []  Strap        []  Toe gizmos    []  Topl        []  NSAIDs         []  Jose        []  Ref:         []  Stress Xray    []  CT        []  MRI  []  Inj:          []  Consider OR      []  Pick OR date    Return in about 1 week (around 7/5/2021). No orders of the defined types were placed in this encounter. No orders of the defined types were placed in this encounter. Rosalinda Montgomery MD  Orthopedic Surgery        Please excuse any typos/errors, as this note was created with the assistance of voice recognition software.  While intending to generate a document that actually reflects the content of the visit, the document can still have some errors including those of syntax and sound-a-like substitutions which may escape proof reading. In such instances, actual meaning can be extrapolated by context.

## 2021-06-28 NOTE — LETTER
Dr. Molina Miles  [address] [facility] [address]  [phone]        6/28/2021     Patient: Audrey Head  YOB: 1980    Dear Sybil Elias MD,    I had the pleasure of seeing one of your patients, Audrey Head recently in the office. Below are the relevant portions of my assessment and plan of care. ASSESSMENT AND PLAN:  She has a left small toe proximal phalanx fracture, sustained on 6/21/2021. Notably, she has no relevant past medical history. We had a discussion today about the likely diagnosis and its natural history, physical exam and imaging findings, as well as various treatment options in detail. Surgically, we discussed a possible left small toe CRPP versus ORIF. We discussed the possibility of shortening, as well as her slight rotational deformity, however, as her toes not impinging on her fourth and is not deviated laterally, I do not believe that the current position will bother her, and she agrees. We did discuss the possibility and risks of displacement. At today's visit, we decided to proceed with conservative management for the time being. Orders/referrals were placed as below at today's visit. The patient will avoid the routine use of NSAIDs to prevent the theoretical risk of delayed healing. The patient will avoid pain provoking activity. She was placed into a wedge cast shoe, and her fifth toe was david taped to her fourth toe. She may weight-bear as tolerated. All questions were answered and the above plan was agreed upon. The patient will return to clinic in 1 week with repeat left foot x-rays. At her next visit, we will review her x-rays and monitor for displacement, and possibly consider surgery again as above. I look forward to serving you and your patients again in the future.  Please don't hesitate to contact me at my mobile number [phone].         Macy Coronel MD  Orthopedic Surgery

## 2022-02-02 DIAGNOSIS — Z12.31 ENCOUNTER FOR SCREENING MAMMOGRAM FOR MALIGNANT NEOPLASM OF BREAST: ICD-10-CM

## 2022-04-22 ENCOUNTER — OFFICE VISIT (OUTPATIENT)
Dept: PRIMARY CARE CLINIC | Age: 42
End: 2022-04-22
Payer: COMMERCIAL

## 2022-04-22 VITALS
DIASTOLIC BLOOD PRESSURE: 68 MMHG | HEART RATE: 65 BPM | RESPIRATION RATE: 14 BRPM | WEIGHT: 184.2 LBS | SYSTOLIC BLOOD PRESSURE: 100 MMHG | OXYGEN SATURATION: 99 % | HEIGHT: 70 IN | BODY MASS INDEX: 26.37 KG/M2

## 2022-04-22 DIAGNOSIS — Z13.220 SCREENING FOR LIPID DISORDERS: ICD-10-CM

## 2022-04-22 DIAGNOSIS — Z13.29 SCREENING FOR THYROID DISORDER: ICD-10-CM

## 2022-04-22 DIAGNOSIS — Z13.0 SCREENING FOR DEFICIENCY ANEMIA: ICD-10-CM

## 2022-04-22 DIAGNOSIS — Z13.1 SCREENING FOR DIABETES MELLITUS: ICD-10-CM

## 2022-04-22 DIAGNOSIS — E55.9 VITAMIN D DEFICIENCY: ICD-10-CM

## 2022-04-22 DIAGNOSIS — Z00.00 ENCOUNTER FOR GENERAL ADULT MEDICAL EXAMINATION W/O ABNORMAL FINDINGS: Primary | ICD-10-CM

## 2022-04-22 PROCEDURE — 99396 PREV VISIT EST AGE 40-64: CPT | Performed by: NURSE PRACTITIONER

## 2022-04-22 ASSESSMENT — PATIENT HEALTH QUESTIONNAIRE - PHQ9
2. FEELING DOWN, DEPRESSED OR HOPELESS: 0
SUM OF ALL RESPONSES TO PHQ QUESTIONS 1-9: 0
SUM OF ALL RESPONSES TO PHQ9 QUESTIONS 1 & 2: 0
SUM OF ALL RESPONSES TO PHQ QUESTIONS 1-9: 0
SUM OF ALL RESPONSES TO PHQ QUESTIONS 1-9: 0
1. LITTLE INTEREST OR PLEASURE IN DOING THINGS: 0
SUM OF ALL RESPONSES TO PHQ QUESTIONS 1-9: 0

## 2022-04-22 ASSESSMENT — ENCOUNTER SYMPTOMS
CONSTIPATION: 1
SHORTNESS OF BREATH: 0
ABDOMINAL PAIN: 0
COUGH: 0
BACK PAIN: 0

## 2022-04-22 NOTE — PROGRESS NOTES
[address] PRIMARY CARE  [address] [facility]  [address] [address]  Dept: [phone]  Dept Fax: [phone]    Silvia Russo is a 42 y.o. female who presentstoday for her medical conditions/complaints as noted below. Silvia Russo is c/o of  Chief Complaint   Patient presents with    New Patient     Establish Care; Discuss GI Concerns         HPI:     Presents for new patient visit/est care  BP well controlled  Has gained 9lb since LOV 2021 with PCP  Working on diet/exercise- had right hip injury with surgery and also foot injury  Tries to eat fruits/veggies contributes to abdominal pain/discomfort  Would like to discuss weight loss options  Her goal is 160lb  Works for [employer],     Follows with Dr. Ottoniel Davis for GYN  Willing to update annual labs  Reviewed recent mammogram, dense tissue. Suggested MRI breast. Will review with GYN    Hx of migraines, had tried and failed 2 triptans in the past  States that her symptoms are not typical and headaches can last up to 6 days  Pain improves with use of Ubrevely  Will write Maria E Grim if needed    C/o IBS C, lifelong  Last BM was on   Denies any abdominal pain currently  Will increase water to 64oz daily and trial miralax/colace use  Continue to monitor      Denies any other problems/concerns      No results found for: LABA1C          ( goal A1C is < 7)   No results found for: LABMICR  LDL Calculated (mg/dL)   Date Value   2017 94   04/15/2016 76       (goal LDL is <100)   AST (U/L)   Date Value   2017 16     ALT (U/L)   Date Value   2017 19     BUN (mg/dL)   Date Value   2017 16     BP Readings from Last 3 Encounters:   22 100/68   21 123/84   21 116/70          ([id number])    History reviewed. No pertinent past medical history.    Past Surgical History:   Procedure Laterality Date    HIP SURGERY Right 2016    TT/Dr. Mily Tovar TONSILLECTOMY reviewed. Constitutional:       Appearance: She is well-developed. HENT:      Head: Normocephalic and atraumatic. Eyes:      Pupils: Pupils are equal, round, and reactive to light. Cardiovascular:      Rate and Rhythm: Normal rate and regular rhythm. Heart sounds: Normal heart sounds. Pulmonary:      Effort: Pulmonary effort is normal.      Breath sounds: Normal breath sounds. Abdominal:      General: Bowel sounds are normal.      Palpations: Abdomen is soft. Tenderness: There is no abdominal tenderness. Musculoskeletal:         General: Normal range of motion. Cervical back: Normal range of motion and neck supple. Skin:     General: Skin is warm and dry. Neurological:      Mental Status: She is alert and oriented to person, place, and time. Psychiatric:         Behavior: Behavior normal.         Thought Content: Thought content normal.         Judgment: Judgment normal.       /68   Pulse 65   Resp 14   Ht 5' 10\" (1.778 m)   Wt 184 lb 3.2 oz (83.6 kg)   SpO2 99%   BMI 26.43 kg/m²     Assessment:       Diagnosis Orders   1. Encounter for general adult medical examination w/o abnormal findings     2. Screening for lipid disorders  Lipid Panel   3. Screening for thyroid disorder  TSH with Reflex   4. Screening for deficiency anemia  CBC   5. Screening for diabetes mellitus  Comprehensive Metabolic Panel    Hemoglobin A1C   6. Vitamin D deficiency  Vitamin D 25 Hydroxy             Plan:      Return in about 1 month (around 5/22/2022) for recheck. 1. HM- Continue diet/exercise. Rx given for annual labs. Discussed adipex or qsymia use. Follow up in one month for recheck.     Orders Placed This Encounter   Procedures    Lipid Panel     Standing Status:   Future     Standing Expiration Date:   4/22/2023     Order Specific Question:   Is Patient Fasting?/# of Hours     Answer:   15    TSH with Reflex     Standing Status:   Future     Standing Expiration Date:   4/22/2023   Mayra

## 2022-04-25 DIAGNOSIS — Z13.0 SCREENING FOR DEFICIENCY ANEMIA: ICD-10-CM

## 2022-04-25 DIAGNOSIS — Z13.29 SCREENING FOR THYROID DISORDER: ICD-10-CM

## 2022-04-25 DIAGNOSIS — Z13.1 SCREENING FOR DIABETES MELLITUS: ICD-10-CM

## 2022-04-25 DIAGNOSIS — Z13.220 SCREENING FOR LIPID DISORDERS: ICD-10-CM

## 2022-04-25 DIAGNOSIS — E66.9 CLASS 1 OBESITY WITHOUT SERIOUS COMORBIDITY IN ADULT, UNSPECIFIED BMI, UNSPECIFIED OBESITY TYPE: Primary | ICD-10-CM

## 2022-04-25 DIAGNOSIS — E55.9 VITAMIN D DEFICIENCY: ICD-10-CM

## 2022-04-25 LAB
ALBUMIN SERPL-MCNC: 4.7 G/DL
ALP BLD-CCNC: 55 U/L
ALT SERPL-CCNC: 11 U/L
ANION GAP SERPL CALCULATED.3IONS-SCNC: 9 MMOL/L
AST SERPL-CCNC: 13 U/L
AVERAGE GLUCOSE: 100
BASOPHILS ABSOLUTE: NORMAL
BASOPHILS RELATIVE PERCENT: NORMAL
BILIRUB SERPL-MCNC: 0.8 MG/DL (ref 0.1–1.4)
BUN BLDV-MCNC: 16 MG/DL
CALCIUM SERPL-MCNC: 9.3 MG/DL
CHLORIDE BLD-SCNC: 105 MMOL/L
CHOLESTEROL, TOTAL: 164 MG/DL
CHOLESTEROL/HDL RATIO: 2.9
CO2: 28 MMOL/L
CREAT SERPL-MCNC: 0.74 MG/DL
EOSINOPHILS ABSOLUTE: NORMAL
EOSINOPHILS RELATIVE PERCENT: NORMAL
GFR CALCULATED: NORMAL
GLUCOSE BLD-MCNC: 82 MG/DL
HBA1C MFR BLD: 5.1 %
HCT VFR BLD CALC: 38.1 % (ref 36–46)
HDLC SERPL-MCNC: 57 MG/DL (ref 35–70)
HEMOGLOBIN: 12.7 G/DL (ref 12–16)
LDL CHOLESTEROL CALCULATED: 98 MG/DL (ref 0–160)
LYMPHOCYTES ABSOLUTE: NORMAL
LYMPHOCYTES RELATIVE PERCENT: NORMAL
MCH RBC QN AUTO: 30 PG
MCHC RBC AUTO-ENTMCNC: 33.3 G/DL
MCV RBC AUTO: 90 FL
MONOCYTES ABSOLUTE: NORMAL
MONOCYTES RELATIVE PERCENT: NORMAL
NEUTROPHILS ABSOLUTE: NORMAL
NEUTROPHILS RELATIVE PERCENT: NORMAL
NONHDLC SERPL-MCNC: NORMAL MG/DL
PLATELET # BLD: 220 K/ΜL
PMV BLD AUTO: 9.3 FL
POTASSIUM SERPL-SCNC: 4.4 MMOL/L
RBC # BLD: NORMAL 10*6/UL
SODIUM BLD-SCNC: 142 MMOL/L
TOTAL PROTEIN: 6.8
TRIGL SERPL-MCNC: 43 MG/DL
TSH SERPL DL<=0.05 MIU/L-ACNC: 1.21 UIU/ML
VITAMIN D 25-HYDROXY: 19.4
VITAMIN D2, 25 HYDROXY: NORMAL
VITAMIN D3,25 HYDROXY: NORMAL
VLDLC SERPL CALC-MCNC: 9 MG/DL
WBC # BLD: 5.5 10^3/ML

## 2022-04-25 RX ORDER — ERGOCALCIFEROL 1.25 MG/1
50000 CAPSULE ORAL WEEKLY
Qty: 12 CAPSULE | Refills: 3 | Status: SHIPPED | OUTPATIENT
Start: 2022-04-25

## 2022-04-25 RX ORDER — PHENTERMINE AND TOPIRAMATE 3.75; 23 MG/1; MG/1
1 CAPSULE, EXTENDED RELEASE ORAL DAILY
Qty: 30 CAPSULE | Refills: 0 | Status: SHIPPED | OUTPATIENT
Start: 2022-04-25 | End: 2022-09-06 | Stop reason: SDUPTHER

## 2022-05-20 ENCOUNTER — OFFICE VISIT (OUTPATIENT)
Dept: PRIMARY CARE CLINIC | Age: 42
End: 2022-05-20
Payer: COMMERCIAL

## 2022-05-20 VITALS
HEIGHT: 71 IN | DIASTOLIC BLOOD PRESSURE: 70 MMHG | BODY MASS INDEX: 24.84 KG/M2 | OXYGEN SATURATION: 98 % | WEIGHT: 177.4 LBS | HEART RATE: 70 BPM | RESPIRATION RATE: 12 BRPM | SYSTOLIC BLOOD PRESSURE: 102 MMHG

## 2022-05-20 DIAGNOSIS — E55.9 VITAMIN D DEFICIENCY: Primary | ICD-10-CM

## 2022-05-20 DIAGNOSIS — E66.9 CLASS 1 OBESITY WITHOUT SERIOUS COMORBIDITY IN ADULT, UNSPECIFIED BMI, UNSPECIFIED OBESITY TYPE: ICD-10-CM

## 2022-05-20 PROCEDURE — 99214 OFFICE O/P EST MOD 30 MIN: CPT | Performed by: NURSE PRACTITIONER

## 2022-05-20 RX ORDER — PHENTERMINE AND TOPIRAMATE 3.75; 23 MG/1; MG/1
1 CAPSULE, EXTENDED RELEASE ORAL DAILY
Qty: 30 CAPSULE | Refills: 0 | Status: CANCELLED | OUTPATIENT
Start: 2022-05-20 | End: 2022-06-19

## 2022-05-20 ASSESSMENT — ENCOUNTER SYMPTOMS
SHORTNESS OF BREATH: 0
BACK PAIN: 0
ABDOMINAL PAIN: 0
COUGH: 0

## 2022-05-20 NOTE — PROGRESS NOTES
[address] PRIMARY CARE  [address] [place] [address]  [address] [address]  Dept: [phone]  Dept Fax: [phone]    Sharee Vásquez is a 42 y.o. female who presentstoday for her medical conditions/complaints as noted below. Sharee Vásquez is c/o of  Chief Complaint   Patient presents with    Discuss Labs           HPI:     Presents for one month recheck  Was referred to office by Jason Mtz  BP well controlled  Has lost 7lb since LOV    Using qsymia daily  Working on diet/exercise  Has noted that it helps to decrease her appetite  Tries to avoid eating after 7pm  Initial increase in headaches but they have improved over the last month  Denies any side effects with med  Willing to increase dose  Her goal weight is 160lb    Reviewed recent labs in detail  All WNL    Denies any other problems/concerns      Hemoglobin A1C (%)   Date Value   2022 5.1             ( goal A1C is < 7)   No results found for: LABMICR  LDL Calculated (mg/dL)   Date Value   2022 98   2017 94   04/15/2016 76       (goal LDL is <100)   AST (U/L)   Date Value   2022 13     ALT (U/L)   Date Value   2022 11     BUN (mg/dL)   Date Value   2022 16     BP Readings from Last 3 Encounters:   22 102/70   22 100/68   21 123/84          ([id number])    History reviewed. No pertinent past medical history. Past Surgical History:   Procedure Laterality Date    HIP SURGERY Right 2016    [facility]/Dr. Jude Manley TONSILLECTOMY         History reviewed. No pertinent family history. Social History     Tobacco Use    Smoking status: Never Smoker    Smokeless tobacco: Never Used   Substance Use Topics    Alcohol use: Yes     Comment: rare      Current Outpatient Medications   Medication Sig Dispense Refill    Phentermine-Topiramate 7.5-46 MG CP24 Take 1 capsule by mouth daily for 30 days.  30 capsule 0    Phentermine-Topiramate (QSYMIA) 3.75-23 MG CP24 Take 1 capsule by mouth daily for 30 days. 30 capsule 0    vitamin D (ERGOCALCIFEROL) 1.25 MG (10000 UT) CAPS capsule Take 1 capsule by mouth once a week 12 capsule 3    Ubrogepant (UBRELVY) 50 MG TABS Use 50mg at onset of headache. May repeat 2 hours later 10 tablet 11     No current facility-administered medications for this visit. Allergies   Allergen Reactions    Bactrim Anxiety and Other (See Comments)     Fever      Pcn [Penicillins] Rash and Other (See Comments)     fever       Health Maintenance   Topic Date Due    DTaP/Tdap/Td vaccine (1 - Tdap) 06/10/2022 (Originally 7/23/1998)    Hepatitis C screen  04/22/2023 (Originally 7/23/1997)    HIV screen  04/22/2023 (Originally 7/23/1994)    Cervical cancer screen  04/22/2023 (Originally 7/23/2000)    Depression Screen  04/22/2023    Lipids  04/23/2027    Flu vaccine  Completed    COVID-19 Vaccine  Completed    Hepatitis A vaccine  Aged Out    Hepatitis B vaccine  Aged Out    Hib vaccine  Aged Out    Meningococcal (ACWY) vaccine  Aged Out    Pneumococcal 0-64 years Vaccine  Aged Out       Subjective:      Review of Systems   Constitutional: Negative for chills, fatigue and fever. HENT: Negative for congestion. Eyes: Negative for visual disturbance. Respiratory: Negative for cough and shortness of breath. Cardiovascular: Negative for chest pain and palpitations. Gastrointestinal: Negative for abdominal pain. Genitourinary: Negative for dysuria. Musculoskeletal: Negative for back pain. Neurological: Positive for headaches. Negative for dizziness and numbness. Psychiatric/Behavioral: Negative for self-injury, sleep disturbance and suicidal ideas. The patient is not nervous/anxious. Objective:     Physical Exam  Vitals and nursing note reviewed. Constitutional:       Appearance: She is well-developed. HENT:      Head: Normocephalic and atraumatic. Eyes:      Pupils: Pupils are equal, round, and reactive to light. Cardiovascular:      Rate and Rhythm: Normal rate and regular rhythm. Heart sounds: Normal heart sounds. Pulmonary:      Effort: Pulmonary effort is normal.      Breath sounds: Normal breath sounds. Abdominal:      General: Bowel sounds are normal.      Palpations: Abdomen is soft. Tenderness: There is no abdominal tenderness. Musculoskeletal:         General: Normal range of motion. Cervical back: Normal range of motion and neck supple. Skin:     General: Skin is warm and dry. Neurological:      Mental Status: She is alert and oriented to person, place, and time. Psychiatric:         Behavior: Behavior normal.         Thought Content: Thought content normal.         Judgment: Judgment normal.       /70   Pulse 70   Resp 12   Ht 5' 10.5\" (1.791 m)   Wt 177 lb 6.4 oz (80.5 kg)   SpO2 98%   Breastfeeding No   BMI 25.09 kg/m²     Assessment:       Diagnosis Orders   1. Vitamin D deficiency     2. Class 1 obesity without serious comorbidity in adult, unspecified BMI, unspecified obesity type  Phentermine-Topiramate 7.5-46 MG CP24             Plan:      Return in about 6 months (around 11/20/2022) for recheck. 1. Vit D Def- Stable. Labs in normal range. Continue diet/exercise. Follow up in six months for recheck. 2. Obesity- Encouraged diet/exercise. Increase qsymia to 7.5mg dose. Update office in one month with weight and how med is working prior to refill. Follow up in six months for recheck/earlier if needed. Orders Placed This Encounter   Medications    Phentermine-Topiramate 7.5-46 MG CP24     Sig: Take 1 capsule by mouth daily for 30 days. Dispense:  30 capsule     Refill:  0       Patient given educational materials - see patient instructions. Discussed use, benefit, and side effects of prescribed medications. All patientquestions answered. Pt voiced understanding. Reviewed health maintenance. Instructedto continue current medications, diet and exercise. Patient agreed with treatmentplan. Follow up as directed.      Electronicallysigned by JOCELIN Marion CNP on 5/20/2022 at 8:55 AM

## 2022-05-26 ENCOUNTER — E-VISIT (OUTPATIENT)
Dept: PRIMARY CARE CLINIC | Age: 42
End: 2022-05-26
Payer: COMMERCIAL

## 2022-05-26 DIAGNOSIS — J34.9 SINUS PROBLEM: Primary | ICD-10-CM

## 2022-05-26 DIAGNOSIS — J06.9 UPPER RESPIRATORY TRACT INFECTION, UNSPECIFIED TYPE: Primary | ICD-10-CM

## 2022-05-26 PROCEDURE — 99422 OL DIG E/M SVC 11-20 MIN: CPT | Performed by: NURSE PRACTITIONER

## 2022-05-26 RX ORDER — AZITHROMYCIN 250 MG/1
TABLET, FILM COATED ORAL
Qty: 6 TABLET | Refills: 0 | Status: SHIPPED | OUTPATIENT
Start: 2022-05-26 | End: 2022-06-05

## 2022-05-26 RX ORDER — METHYLPREDNISOLONE 4 MG/1
TABLET ORAL
Qty: 1 KIT | Refills: 0 | Status: SHIPPED | OUTPATIENT
Start: 2022-05-26 | End: 2022-06-01

## 2022-05-26 ASSESSMENT — LIFESTYLE VARIABLES: SMOKING_STATUS: NO, I'VE NEVER SMOKED

## 2022-05-26 NOTE — PROGRESS NOTES
Reviewed questionnaire    Reviewed meds/allergies    Dx URI    Plan Rx given for zpack and medrol dose pack, follow up if no improvement with use of meds    Time spent on visit 11 min

## 2022-06-27 DIAGNOSIS — E66.9 CLASS 1 OBESITY WITHOUT SERIOUS COMORBIDITY IN ADULT, UNSPECIFIED BMI, UNSPECIFIED OBESITY TYPE: Primary | ICD-10-CM

## 2022-07-28 ENCOUNTER — PATIENT MESSAGE (OUTPATIENT)
Dept: PRIMARY CARE CLINIC | Age: 42
End: 2022-07-28

## 2022-07-28 DIAGNOSIS — E66.9 CLASS 1 OBESITY WITHOUT SERIOUS COMORBIDITY IN ADULT, UNSPECIFIED BMI, UNSPECIFIED OBESITY TYPE: ICD-10-CM

## 2022-07-29 NOTE — TELEPHONE ENCOUNTER
From: Marleta Mcardle  To: Naveen Hart  Sent: 7/28/2022 4:40 PM EDT  Subject: Qsymia Refill    Hi Sherly,    I need a refill on my Qsymia.  It wasnt on my list of current meds so I couldnt do a med refill request.     Thanks, Carley Astorga

## 2022-09-06 RX ORDER — PHENTERMINE AND TOPIRAMATE 3.75; 23 MG/1; MG/1
1 CAPSULE, EXTENDED RELEASE ORAL DAILY
Qty: 30 CAPSULE | Refills: 0 | Status: SHIPPED | OUTPATIENT
Start: 2022-09-06 | End: 2022-09-13

## 2022-09-13 RX ORDER — PHENTERMINE AND TOPIRAMATE 11.25; 69 MG/1; MG/1
1 CAPSULE, EXTENDED RELEASE ORAL DAILY
Qty: 30 CAPSULE | Refills: 0 | Status: SHIPPED | OUTPATIENT
Start: 2022-09-13 | End: 2022-10-13

## 2022-09-14 ENCOUNTER — TELEPHONE (OUTPATIENT)
Dept: PRIMARY CARE CLINIC | Age: 42
End: 2022-09-14

## 2022-09-14 NOTE — TELEPHONE ENCOUNTER
[facility] called stating that the pharmacy gave her the wrong medication and will not take it back she paid 122.00 out of pocket and has a lower dose that what she should be on. She would like to know what to do as she does not have the fund to purchase another prescription.

## 2022-09-14 NOTE — TELEPHONE ENCOUNTER
Spoke with the patient and informed her of the PCP's instructions, the patient verbalized understanding.

## 2022-11-08 ENCOUNTER — PATIENT MESSAGE (OUTPATIENT)
Dept: PRIMARY CARE CLINIC | Age: 42
End: 2022-11-08

## 2022-11-08 DIAGNOSIS — E66.9 CLASS 1 OBESITY WITHOUT SERIOUS COMORBIDITY IN ADULT, UNSPECIFIED BMI, UNSPECIFIED OBESITY TYPE: ICD-10-CM

## 2022-11-09 RX ORDER — PHENTERMINE AND TOPIRAMATE 11.25; 69 MG/1; MG/1
1 CAPSULE, EXTENDED RELEASE ORAL DAILY
Qty: 30 CAPSULE | Refills: 0 | Status: SHIPPED | OUTPATIENT
Start: 2022-11-09 | End: 2022-12-09

## 2022-11-09 NOTE — TELEPHONE ENCOUNTER
From: Kelly oConey  To: Shravan Mendiola  Sent: 11/8/2022 10:04 PM EST  Subject: Qsymia refill    Hello,    Can I please have a refill on my 11.25mg Qsymia? Thanks!

## 2022-11-23 ENCOUNTER — OFFICE VISIT (OUTPATIENT)
Dept: PRIMARY CARE CLINIC | Age: 42
End: 2022-11-23
Payer: COMMERCIAL

## 2022-11-23 VITALS
HEART RATE: 69 BPM | HEIGHT: 70 IN | BODY MASS INDEX: 23.88 KG/M2 | SYSTOLIC BLOOD PRESSURE: 118 MMHG | WEIGHT: 166.8 LBS | RESPIRATION RATE: 12 BRPM | OXYGEN SATURATION: 99 % | DIASTOLIC BLOOD PRESSURE: 74 MMHG

## 2022-11-23 DIAGNOSIS — E55.9 VITAMIN D DEFICIENCY: Primary | ICD-10-CM

## 2022-11-23 DIAGNOSIS — Z12.31 VISIT FOR SCREENING MAMMOGRAM: ICD-10-CM

## 2022-11-23 DIAGNOSIS — E66.9 CLASS 1 OBESITY WITHOUT SERIOUS COMORBIDITY IN ADULT, UNSPECIFIED BMI, UNSPECIFIED OBESITY TYPE: ICD-10-CM

## 2022-11-23 PROCEDURE — 99214 OFFICE O/P EST MOD 30 MIN: CPT | Performed by: NURSE PRACTITIONER

## 2022-11-23 RX ORDER — ERGOCALCIFEROL 1.25 MG/1
50000 CAPSULE ORAL WEEKLY
Qty: 12 CAPSULE | Refills: 3 | Status: SHIPPED | OUTPATIENT
Start: 2022-11-23

## 2022-11-23 SDOH — ECONOMIC STABILITY: FOOD INSECURITY: WITHIN THE PAST 12 MONTHS, THE FOOD YOU BOUGHT JUST DIDN'T LAST AND YOU DIDN'T HAVE MONEY TO GET MORE.: NEVER TRUE

## 2022-11-23 SDOH — ECONOMIC STABILITY: FOOD INSECURITY: WITHIN THE PAST 12 MONTHS, YOU WORRIED THAT YOUR FOOD WOULD RUN OUT BEFORE YOU GOT MONEY TO BUY MORE.: NEVER TRUE

## 2022-11-23 ASSESSMENT — PATIENT HEALTH QUESTIONNAIRE - PHQ9
SUM OF ALL RESPONSES TO PHQ9 QUESTIONS 1 & 2: 0
SUM OF ALL RESPONSES TO PHQ QUESTIONS 1-9: 0
2. FEELING DOWN, DEPRESSED OR HOPELESS: 0
SUM OF ALL RESPONSES TO PHQ QUESTIONS 1-9: 0
1. LITTLE INTEREST OR PLEASURE IN DOING THINGS: 0

## 2022-11-23 ASSESSMENT — SOCIAL DETERMINANTS OF HEALTH (SDOH): HOW HARD IS IT FOR YOU TO PAY FOR THE VERY BASICS LIKE FOOD, HOUSING, MEDICAL CARE, AND HEATING?: NOT HARD AT ALL

## 2022-11-23 ASSESSMENT — ENCOUNTER SYMPTOMS
BACK PAIN: 0
SHORTNESS OF BREATH: 0
ABDOMINAL PAIN: 0
COUGH: 0

## 2022-11-23 NOTE — PROGRESS NOTES
[facility] PRIMARY CARE  [address] [place]  [address]  [address] [address]  Dept: [phone]  Dept Fax: [phone]    Huseyin Patient is a 36 y.o. female who presentstoday for her medical conditions/complaints as noted below. Huseyin Patient is c/o of  Chief Complaint   Patient presents with    6 Month Follow-Up    Obesity    Annual Exam           HPI:     Presents for annual exam  BP well controlled   Has lost 11lb since LOV- congratulated patient  Continues diet/exercise  Using qsymia and tolerating med well  Denies any side effects with use of med    Annual labs up to date  Has appt with Dr. Jose Fox in one month  Mammogram up to date    Denies any other problems/concerns      Hemoglobin A1C (%)   Date Value   2022 5.1             ( goal A1C is < 7)   No results found for: LABMICR  LDL Calculated (mg/dL)   Date Value   2022 98   2017 94   04/15/2016 76       (goal LDL is <100)   AST (U/L)   Date Value   2022 13     ALT (U/L)   Date Value   2022 11     BUN (mg/dL)   Date Value   2022 16     BP Readings from Last 3 Encounters:   22 118/74   22 102/70   22 100/68          ([id number])    History reviewed. No pertinent past medical history. Past Surgical History:   Procedure Laterality Date    HIP SURGERY Right 2016    [facility]/Dr. Rob Fuentes    TONSILLECTOMY         History reviewed. No pertinent family history. Social History     Tobacco Use    Smoking status: Never    Smokeless tobacco: Never   Substance Use Topics    Alcohol use: Yes     Comment: rare      Current Outpatient Medications   Medication Sig Dispense Refill    Phentermine-Topiramate (QSYMIA) 11.25-69 MG CP24 Take 1 capsule by mouth daily for 30 days. 30 capsule 0    Ubrogepant (UBRELVY) 50 MG TABS Use 50mg at onset of headache. May repeat 2 hours later 10 tablet 11     No current facility-administered medications for this visit.      Allergies Allergen Reactions    Bactrim Anxiety and Other (See Comments)     Fever      Pcn [Penicillins] Rash and Other (See Comments)     fever       Health Maintenance   Topic Date Due    DTaP/Tdap/Td vaccine (1 - Tdap) 12/14/2022 (Originally 7/23/1998)    Hepatitis C screen  04/22/2023 (Originally 7/23/1997)    HIV screen  04/22/2023 (Originally 7/23/1994)    Cervical cancer screen  04/22/2023 (Originally 7/23/2000)    Depression Screen  04/22/2023    Lipids  04/23/2027    Flu vaccine  Completed    COVID-19 Vaccine  Completed    Hepatitis A vaccine  Aged Out    Hib vaccine  Aged Out    Meningococcal (ACWY) vaccine  Aged Out    Pneumococcal 0-64 years Vaccine  Aged Out       Subjective:      Review of Systems   Constitutional:  Negative for chills, fatigue and fever. HENT:  Negative for congestion. Eyes:  Negative for visual disturbance. Respiratory:  Negative for cough and shortness of breath. Cardiovascular:  Negative for chest pain and palpitations. Gastrointestinal:  Negative for abdominal pain. Genitourinary:  Negative for dysuria. Musculoskeletal:  Negative for back pain. Neurological:  Negative for dizziness, numbness and headaches. Psychiatric/Behavioral:  Negative for self-injury, sleep disturbance and suicidal ideas. The patient is not nervous/anxious. Objective:     Physical Exam  Vitals and nursing note reviewed. Constitutional:       Appearance: She is well-developed. HENT:      Head: Normocephalic and atraumatic. Eyes:      Pupils: Pupils are equal, round, and reactive to light. Cardiovascular:      Rate and Rhythm: Normal rate and regular rhythm. Heart sounds: Normal heart sounds. Pulmonary:      Effort: Pulmonary effort is normal.      Breath sounds: Normal breath sounds. Abdominal:      General: Bowel sounds are normal.      Palpations: Abdomen is soft. Tenderness: There is no abdominal tenderness. Musculoskeletal:         General: Normal range of motion. Cervical back: Normal range of motion and neck supple. Skin:     General: Skin is warm and dry. Neurological:      Mental Status: She is alert and oriented to person, place, and time. Psychiatric:         Behavior: Behavior normal.         Thought Content: Thought content normal.         Judgment: Judgment normal.     /74   Pulse 69   Resp 12   Ht 5' 10\" (1.778 m)   Wt 166 lb 12.8 oz (75.7 kg)   SpO2 99%   BMI 23.93 kg/m²     Assessment:       Diagnosis Orders   1. Encounter for general adult medical examination w/o abnormal findings        2. Visit for screening mammogram  DAVID DIGITAL SCREEN W OR WO CAD BILATERAL                Plan:      Return in about 6 months (around 5/23/2023) for med check. HM- continue diet/exercise. Continue current meds. Follow up in six months for recheck/earlier if needed. Orders Placed This Encounter   Procedures    DAVID DIGITAL SCREEN W OR WO CAD BILATERAL     Standing Status:   Future     Standing Expiration Date:   11/23/2023     Order Specific Question:   Reason for exam:     Answer:   screening      No orders of the defined types were placed in this encounter. Patient given educational materials - see patient instructions. Discussed use, benefit, and side effects of prescribed medications. All patientquestions answered. Pt voiced understanding. Reviewed health maintenance. Instructedto continue current medications, diet and exercise. Patient agreed with treatmentplan. Follow up as directed.      Electronicallysigned by JOCELIN Rico CNP on 11/23/2022 at 8:20 AM

## 2023-01-05 ENCOUNTER — PATIENT MESSAGE (OUTPATIENT)
Dept: PRIMARY CARE CLINIC | Age: 43
End: 2023-01-05

## 2023-01-05 DIAGNOSIS — E66.9 CLASS 1 OBESITY WITHOUT SERIOUS COMORBIDITY IN ADULT, UNSPECIFIED BMI, UNSPECIFIED OBESITY TYPE: ICD-10-CM

## 2023-01-05 RX ORDER — PHENTERMINE AND TOPIRAMATE 11.25; 69 MG/1; MG/1
1 CAPSULE, EXTENDED RELEASE ORAL DAILY
Qty: 30 CAPSULE | Refills: 0 | Status: SHIPPED | OUTPATIENT
Start: 2023-01-05 | End: 2023-02-04

## 2023-01-05 NOTE — TELEPHONE ENCOUNTER
From: John Stern  To: Akila Merino  Sent: 1/5/2023 5:58 AM EST  Subject: Qsymia Refill    Hello,    Can I please have a refill of the 11.25mg of Qsymia? Also, I would like to chance my pharmacy to the [facility] if possible. [facility] no longer accepts my prescription coverage.      Thanks, Catherine Cuba

## 2023-02-08 ENCOUNTER — OFFICE VISIT (OUTPATIENT)
Dept: PRIMARY CARE CLINIC | Age: 43
End: 2023-02-08
Payer: COMMERCIAL

## 2023-02-08 VITALS
OXYGEN SATURATION: 96 % | SYSTOLIC BLOOD PRESSURE: 114 MMHG | WEIGHT: 161.4 LBS | HEIGHT: 71 IN | DIASTOLIC BLOOD PRESSURE: 72 MMHG | RESPIRATION RATE: 12 BRPM | HEART RATE: 82 BPM | BODY MASS INDEX: 22.6 KG/M2

## 2023-02-08 DIAGNOSIS — E66.9 CLASS 1 OBESITY WITHOUT SERIOUS COMORBIDITY IN ADULT, UNSPECIFIED BMI, UNSPECIFIED OBESITY TYPE: ICD-10-CM

## 2023-02-08 DIAGNOSIS — Z00.00 ENCOUNTER FOR GENERAL ADULT MEDICAL EXAMINATION W/O ABNORMAL FINDINGS: Primary | ICD-10-CM

## 2023-02-08 PROCEDURE — 99396 PREV VISIT EST AGE 40-64: CPT | Performed by: NURSE PRACTITIONER

## 2023-02-08 RX ORDER — PHENTERMINE AND TOPIRAMATE 11.25; 69 MG/1; MG/1
1 CAPSULE, EXTENDED RELEASE ORAL DAILY
Qty: 30 CAPSULE | Refills: 3 | Status: SHIPPED | OUTPATIENT
Start: 2023-02-08 | End: 2023-03-10

## 2023-02-08 SDOH — ECONOMIC STABILITY: HOUSING INSECURITY
IN THE LAST 12 MONTHS, WAS THERE A TIME WHEN YOU DID NOT HAVE A STEADY PLACE TO SLEEP OR SLEPT IN A SHELTER (INCLUDING NOW)?: NO

## 2023-02-08 SDOH — ECONOMIC STABILITY: INCOME INSECURITY: HOW HARD IS IT FOR YOU TO PAY FOR THE VERY BASICS LIKE FOOD, HOUSING, MEDICAL CARE, AND HEATING?: NOT HARD AT ALL

## 2023-02-08 SDOH — ECONOMIC STABILITY: FOOD INSECURITY: WITHIN THE PAST 12 MONTHS, THE FOOD YOU BOUGHT JUST DIDN'T LAST AND YOU DIDN'T HAVE MONEY TO GET MORE.: NEVER TRUE

## 2023-02-08 SDOH — ECONOMIC STABILITY: FOOD INSECURITY: WITHIN THE PAST 12 MONTHS, YOU WORRIED THAT YOUR FOOD WOULD RUN OUT BEFORE YOU GOT MONEY TO BUY MORE.: NEVER TRUE

## 2023-02-08 ASSESSMENT — PATIENT HEALTH QUESTIONNAIRE - PHQ9
SUM OF ALL RESPONSES TO PHQ QUESTIONS 1-9: 0
SUM OF ALL RESPONSES TO PHQ QUESTIONS 1-9: 0
1. LITTLE INTEREST OR PLEASURE IN DOING THINGS: 0
SUM OF ALL RESPONSES TO PHQ QUESTIONS 1-9: 0
SUM OF ALL RESPONSES TO PHQ QUESTIONS 1-9: 0
SUM OF ALL RESPONSES TO PHQ9 QUESTIONS 1 & 2: 0
2. FEELING DOWN, DEPRESSED OR HOPELESS: 0

## 2023-02-08 ASSESSMENT — ENCOUNTER SYMPTOMS
SHORTNESS OF BREATH: 0
ABDOMINAL PAIN: 0
BACK PAIN: 0
COUGH: 0

## 2023-02-08 NOTE — PROGRESS NOTES
[facility] PRIMARY CARE  [facility] [address] [place]  [address]  [address] [address]  Dept: [phone]  Dept Fax: [phone]    Yamileth Todd is a 36 y.o. female who presentstoday for her medical conditions/complaints as noted below. Yamileth Todd is c/o of  Chief Complaint   Patient presents with    Annual Exam           HPI:     Presents for annual exam  Has form to complete for employer  BP well controlled  Has lost 5lb since LOV- congratulated patient  Continues to work on diet/exercise  Using qsymia- denies side effects. Will send rx to mail order    Willing to update annual labs  Has upcoming appt with GYN, will have the mammogram updated with this visit    Denies any other problems/concerns      Hemoglobin A1C (%)   Date Value   2022 5.1             ( goal A1C is < 7)   No results found for: LABMICR  LDL Calculated (mg/dL)   Date Value   2022 98   2017 94   04/15/2016 76       (goal LDL is <100)   AST (U/L)   Date Value   2022 13     ALT (U/L)   Date Value   2022 11     BUN (mg/dL)   Date Value   2022 16     BP Readings from Last 3 Encounters:   23 114/72   22 118/74   22 102/70          ([id number])    History reviewed. No pertinent past medical history. Past Surgical History:   Procedure Laterality Date    HIP SURGERY Right 2016    [facility]/Dr. Cruz Matta    TONSILLECTOMY         History reviewed. No pertinent family history. Social History     Tobacco Use    Smoking status: Never    Smokeless tobacco: Never   Substance Use Topics    Alcohol use: Yes     Comment: rare      Current Outpatient Medications   Medication Sig Dispense Refill    Phentermine-Topiramate (QSYMIA) 11.25-69 MG CP24 Take 1 capsule by mouth daily for 30 days. 30 capsule 3    vitamin D (ERGOCALCIFEROL) 1.25 MG (32357 UT) CAPS capsule Take 1 capsule by mouth once a week 12 capsule 3    Ubrogepant (UBRELVY) 50 MG TABS Use 50mg at onset of headache. May repeat 2 hours later 10 tablet 11     No current facility-administered medications for this visit. Allergies   Allergen Reactions    Bactrim Anxiety and Other (See Comments)     Fever      Pcn [Penicillins] Rash and Other (See Comments)     fever       Health Maintenance   Topic Date Due    DTaP/Tdap/Td vaccine (1 - Tdap) 03/01/2023 (Originally 7/23/1998)    Hepatitis C screen  04/22/2023 (Originally 7/23/1997)    HIV screen  04/22/2023 (Originally 7/23/1994)    Cervical cancer screen  04/22/2023 (Originally 7/23/2000)    Depression Screen  11/23/2023    Lipids  04/23/2027    Flu vaccine  Completed    COVID-19 Vaccine  Completed    Hepatitis A vaccine  Aged Out    Hib vaccine  Aged Out    Meningococcal (ACWY) vaccine  Aged Out    Pneumococcal 0-64 years Vaccine  Aged Out       Subjective:      Review of Systems   Constitutional:  Negative for chills, fatigue and fever. HENT:  Negative for congestion. Eyes:  Negative for visual disturbance. Respiratory:  Negative for cough and shortness of breath. Cardiovascular:  Negative for chest pain and palpitations. Gastrointestinal:  Negative for abdominal pain. Genitourinary:  Negative for dysuria. Musculoskeletal:  Negative for back pain. Neurological:  Negative for dizziness, numbness and headaches. Psychiatric/Behavioral:  Negative for self-injury, sleep disturbance and suicidal ideas. The patient is not nervous/anxious. Objective:     Physical Exam  Vitals and nursing note reviewed. Constitutional:       Appearance: She is well-developed. HENT:      Head: Normocephalic and atraumatic. Eyes:      Pupils: Pupils are equal, round, and reactive to light. Cardiovascular:      Rate and Rhythm: Normal rate and regular rhythm. Heart sounds: Normal heart sounds. Pulmonary:      Effort: Pulmonary effort is normal.      Breath sounds: Normal breath sounds.    Abdominal:      General: Bowel sounds are normal.      Palpations: Abdomen is soft.      Tenderness: There is no abdominal tenderness. Musculoskeletal:         General: Normal range of motion. Cervical back: Normal range of motion and neck supple. Skin:     General: Skin is warm and dry. Neurological:      Mental Status: She is alert and oriented to person, place, and time. Psychiatric:         Behavior: Behavior normal.         Thought Content: Thought content normal.         Judgment: Judgment normal.     /72   Pulse 82   Resp 12   Ht 5' 10.5\" (1.791 m)   Wt 161 lb 6.4 oz (73.2 kg)   SpO2 96%   BMI 22.83 kg/m²     Assessment:       Diagnosis Orders   1. Encounter for general adult medical examination w/o abnormal findings  Comprehensive Metabolic Panel    Lipid Panel    CBC    TSH with Reflex      2. Class 1 obesity without serious comorbidity in adult, unspecified BMI, unspecified obesity type  Phentermine-Topiramate (QSYMIA) 11.25-69 MG CP24                Plan:      Return in about 6 months (around 8/8/2023) for can cancel may- recheck 6 months . HM- Continue diet/exercise. Continue current meds. Rx given for annual labs. Follow up in six months for recheck/earlier if needed. Orders Placed This Encounter   Procedures    Comprehensive Metabolic Panel     Standing Status:   Future     Standing Expiration Date:   2/8/2024    Lipid Panel     Standing Status:   Future     Standing Expiration Date:   2/8/2024     Order Specific Question:   Is Patient Fasting?/# of Hours     Answer:   12    CBC     Standing Status:   Future     Standing Expiration Date:   2/8/2024    TSH with Reflex     Standing Status:   Future     Standing Expiration Date:   2/8/2024        Orders Placed This Encounter   Medications    Phentermine-Topiramate (QSYMIA) 11.25-69 MG CP24     Sig: Take 1 capsule by mouth daily for 30 days. Dispense:  30 capsule     Refill:  3       Patient given educational materials - see patient instructions. Discussed use, benefit, and side effects of prescribed medications. All patientquestions answered. Pt voiced understanding. Reviewed health maintenance. Instructedto continue current medications, diet and exercise. Patient agreed with treatmentplan. Follow up as directed.      Electronicallysigned by JOCELIN Ferreira CNP on 2/8/2023 at 11:05 AM

## 2023-07-10 ENCOUNTER — PATIENT MESSAGE (OUTPATIENT)
Dept: PRIMARY CARE CLINIC | Age: 43
End: 2023-07-10

## 2023-07-10 DIAGNOSIS — E66.9 CLASS 1 OBESITY WITHOUT SERIOUS COMORBIDITY IN ADULT, UNSPECIFIED BMI, UNSPECIFIED OBESITY TYPE: ICD-10-CM

## 2023-07-10 RX ORDER — PHENTERMINE AND TOPIRAMATE 11.25; 69 MG/1; MG/1
1 CAPSULE, EXTENDED RELEASE ORAL DAILY
Qty: 30 CAPSULE | Refills: 3 | Status: SHIPPED | OUTPATIENT
Start: 2023-07-10 | End: 2023-07-14 | Stop reason: SDUPTHER

## 2023-07-10 RX ORDER — UBROGEPANT 50 MG/1
TABLET ORAL
Qty: 10 TABLET | Refills: 11 | Status: SHIPPED | OUTPATIENT
Start: 2023-07-10

## 2023-07-10 NOTE — TELEPHONE ENCOUNTER
From: Maki Carter  To: Oralia Mccord  Sent: 7/10/2023 11:25 AM EDT  Subject: Medication Question    Hello,    I need to refill my Benigno Fernando but when I chose that on the refill prescription menu, it sent a message to the provider who originally prescribed it and is now retired. Can I please have that medication refilled and sent to the [facility] at [facility]? I also requested a refill of the Qsymia through the mail-order pharmacy.      Thanks, Caryl Haynes

## 2023-07-10 NOTE — TELEPHONE ENCOUNTER
Last Visit:  6/2/2021     Next Visit Date:  Future Appointments   Date Time Provider [address]   8/9/2023  8:00 AM JOCELIN Jerez - CNP [facility] [address] Maintenance   Topic Date Due    HIV screen  Never done    Hepatitis C screen  Never done    DTaP/Tdap/Td vaccine (1 - Tdap) Never done    Cervical cancer screen  Never done    Flu vaccine (1) 08/01/2023    Depression Screen  02/08/2024    Lipids  04/23/2027    COVID-19 Vaccine  Completed    Hepatitis A vaccine  Aged Out    Hib vaccine  Aged Out    Meningococcal (ACWY) vaccine  Aged Out    Pneumococcal 0-64 years Vaccine  Aged Out    Diabetes screen  Discontinued       Hemoglobin A1C (%)   Date Value   04/25/2022 5.1             ( goal A1C is < 7)   No results found for: LABMICR  LDL Calculated (mg/dL)   Date Value   04/23/2022 98   03/16/2017 94       (goal LDL is <100)   AST (U/L)   Date Value   04/23/2022 13     ALT (U/L)   Date Value   04/23/2022 11     BUN (mg/dL)   Date Value   04/23/2022 16     BP Readings from Last 3 Encounters:   02/08/23 114/72   11/23/22 118/74   05/20/22 102/70          (goal 120/80)    All Future Testing planned in [facility]  Lab Frequency Next Occurrence   Comprehensive Metabolic Panel Once 42/76/6088   Lipid Panel Once 02/08/2023   CBC Once 02/08/2023   TSH with Reflex Once 02/08/2023               Patient Active Problem List:     Raynaud's disease without gangrene

## 2023-07-14 RX ORDER — PHENTERMINE AND TOPIRAMATE 11.25; 69 MG/1; MG/1
1 CAPSULE, EXTENDED RELEASE ORAL DAILY
Qty: 30 CAPSULE | Refills: 3 | Status: SHIPPED | OUTPATIENT
Start: 2023-07-14 | End: 2023-08-13

## 2023-08-09 ENCOUNTER — OFFICE VISIT (OUTPATIENT)
Dept: PRIMARY CARE CLINIC | Age: 43
End: 2023-08-09
Payer: COMMERCIAL

## 2023-08-09 VITALS
HEIGHT: 71 IN | DIASTOLIC BLOOD PRESSURE: 60 MMHG | WEIGHT: 161 LBS | SYSTOLIC BLOOD PRESSURE: 112 MMHG | OXYGEN SATURATION: 98 % | BODY MASS INDEX: 22.54 KG/M2 | HEART RATE: 66 BPM

## 2023-08-09 DIAGNOSIS — E66.9 CLASS 1 OBESITY WITHOUT SERIOUS COMORBIDITY IN ADULT, UNSPECIFIED BMI, UNSPECIFIED OBESITY TYPE: ICD-10-CM

## 2023-08-09 DIAGNOSIS — Z13.220 SCREENING FOR LIPID DISORDERS: ICD-10-CM

## 2023-08-09 DIAGNOSIS — R14.0 ABDOMINAL DISTENTION: Primary | ICD-10-CM

## 2023-08-09 PROCEDURE — 99214 OFFICE O/P EST MOD 30 MIN: CPT | Performed by: NURSE PRACTITIONER

## 2023-08-09 RX ORDER — YOHIMBE BARK 500 MG
1 CAPSULE ORAL DAILY
Qty: 30 CAPSULE | Refills: 0 | Status: SHIPPED | OUTPATIENT
Start: 2023-08-09

## 2023-08-09 RX ORDER — PHENTERMINE AND TOPIRAMATE 15; 92 MG/1; MG/1
1 CAPSULE, EXTENDED RELEASE ORAL DAILY
Qty: 30 CAPSULE | Refills: 5 | Status: SHIPPED | OUTPATIENT
Start: 2023-08-09 | End: 2023-09-08

## 2023-08-09 SDOH — ECONOMIC STABILITY: FOOD INSECURITY: WITHIN THE PAST 12 MONTHS, THE FOOD YOU BOUGHT JUST DIDN'T LAST AND YOU DIDN'T HAVE MONEY TO GET MORE.: NEVER TRUE

## 2023-08-09 SDOH — ECONOMIC STABILITY: INCOME INSECURITY: HOW HARD IS IT FOR YOU TO PAY FOR THE VERY BASICS LIKE FOOD, HOUSING, MEDICAL CARE, AND HEATING?: NOT HARD AT ALL

## 2023-08-09 SDOH — ECONOMIC STABILITY: FOOD INSECURITY: WITHIN THE PAST 12 MONTHS, YOU WORRIED THAT YOUR FOOD WOULD RUN OUT BEFORE YOU GOT MONEY TO BUY MORE.: NEVER TRUE

## 2023-08-09 ASSESSMENT — ENCOUNTER SYMPTOMS
BACK PAIN: 0
COUGH: 0
CONSTIPATION: 1
ABDOMINAL PAIN: 0
ABDOMINAL DISTENTION: 1
SHORTNESS OF BREATH: 0

## 2023-08-09 ASSESSMENT — PATIENT HEALTH QUESTIONNAIRE - PHQ9
1. LITTLE INTEREST OR PLEASURE IN DOING THINGS: 0
SUM OF ALL RESPONSES TO PHQ QUESTIONS 1-9: 0
SUM OF ALL RESPONSES TO PHQ QUESTIONS 1-9: 0
SUM OF ALL RESPONSES TO PHQ9 QUESTIONS 1 & 2: 0
SUM OF ALL RESPONSES TO PHQ QUESTIONS 1-9: 0
2. FEELING DOWN, DEPRESSED OR HOPELESS: 0
SUM OF ALL RESPONSES TO PHQ QUESTIONS 1-9: 0

## 2023-12-14 RX ORDER — UBROGEPANT 50 MG/1
TABLET ORAL
Qty: 10 TABLET | Refills: 11 | OUTPATIENT
Start: 2023-12-14

## 2024-02-09 ENCOUNTER — OFFICE VISIT (OUTPATIENT)
Dept: PRIMARY CARE CLINIC | Age: 44
End: 2024-02-09
Payer: COMMERCIAL

## 2024-02-09 VITALS
SYSTOLIC BLOOD PRESSURE: 118 MMHG | WEIGHT: 148 LBS | HEART RATE: 84 BPM | DIASTOLIC BLOOD PRESSURE: 76 MMHG | BODY MASS INDEX: 20.94 KG/M2 | OXYGEN SATURATION: 97 %

## 2024-02-09 DIAGNOSIS — Z13.29 SCREENING FOR THYROID DISORDER: ICD-10-CM

## 2024-02-09 DIAGNOSIS — Z00.00 ENCOUNTER FOR GENERAL ADULT MEDICAL EXAMINATION W/O ABNORMAL FINDINGS: Primary | ICD-10-CM

## 2024-02-09 DIAGNOSIS — Z13.0 SCREENING FOR DEFICIENCY ANEMIA: ICD-10-CM

## 2024-02-09 DIAGNOSIS — E66.9 CLASS 1 OBESITY WITHOUT SERIOUS COMORBIDITY IN ADULT, UNSPECIFIED BMI, UNSPECIFIED OBESITY TYPE: ICD-10-CM

## 2024-02-09 DIAGNOSIS — Z13.1 SCREENING FOR DIABETES MELLITUS: ICD-10-CM

## 2024-02-09 DIAGNOSIS — Z13.220 SCREENING FOR LIPID DISORDERS: ICD-10-CM

## 2024-02-09 DIAGNOSIS — Z12.31 VISIT FOR SCREENING MAMMOGRAM: ICD-10-CM

## 2024-02-09 PROCEDURE — 99396 PREV VISIT EST AGE 40-64: CPT | Performed by: NURSE PRACTITIONER

## 2024-02-09 RX ORDER — PHENTERMINE AND TOPIRAMATE 11.25; 69 MG/1; MG/1
1 CAPSULE, EXTENDED RELEASE ORAL DAILY
Qty: 30 CAPSULE | Refills: 5 | Status: SHIPPED | OUTPATIENT
Start: 2024-02-09 | End: 2024-03-10

## 2024-02-09 RX ORDER — OMEPRAZOLE 40 MG/1
40 CAPSULE, DELAYED RELEASE ORAL 2 TIMES DAILY
Qty: 28 CAPSULE | Refills: 0 | Status: SHIPPED | OUTPATIENT
Start: 2024-02-09 | End: 2024-02-23

## 2024-02-09 RX ORDER — HYDROCORTISONE 25 MG/G
CREAM TOPICAL 2 TIMES DAILY
Qty: 28 G | Refills: 3 | Status: SHIPPED | OUTPATIENT
Start: 2024-02-09

## 2024-02-09 ASSESSMENT — PATIENT HEALTH QUESTIONNAIRE - PHQ9
SUM OF ALL RESPONSES TO PHQ9 QUESTIONS 1 & 2: 0
SUM OF ALL RESPONSES TO PHQ QUESTIONS 1-9: 0
1. LITTLE INTEREST OR PLEASURE IN DOING THINGS: 0
SUM OF ALL RESPONSES TO PHQ QUESTIONS 1-9: 0
2. FEELING DOWN, DEPRESSED OR HOPELESS: 0

## 2024-02-09 ASSESSMENT — ENCOUNTER SYMPTOMS
ABDOMINAL PAIN: 0
BACK PAIN: 0
COUGH: 0
SHORTNESS OF BREATH: 0

## 2024-02-09 NOTE — PROGRESS NOTES
[facility]  [facility] PRIMARY CARE  [address] [place] [address] [address]  [address]  [facility] [address]  Dept: [phone]  Dept Fax: [phone]    Dalia Mark is a 43 y.o. female who presentstoday for her medical conditions/complaints as noted below.  Dalia Mark is c/o of  Chief Complaint   Patient presents with    Annual Exam    Sleep Problem     Able to fall asleep but does not stay asleep every night    Heartburn     All the time-does not matter if she ate or not. Also wakes her up, burps a lot Tried husbands Omeprazole-no help           HPI:     Presents for annual exam  BP well controlled  Has lost 13lb since LOV- congratulated patient  Continues to work on diet/exercise    C/o insomnia  Going to bed between 10-11, falling asleep ok  Waking at 2am, unsure why this is happening  Has to be up for work at 5am  Tried melatonin and benadryl without improvement    Increase in abdominal pain, acid reflux  Tried omeprazole 20mg without improvement    Concern for hemorrhoids  Stool soft/easy to pass  Avoids straining or prolonged sitting  Encouraged 60oz water daily    Follows with GYN- has appt for pap and will update MRI breast    Willing to update annual labs    Denies any other problems/concerns        Hemoglobin A1C (%)   Date Value   04/25/2022 5.1             ( goal A1C is < 7)   No components found for: \"LABMICR\"  LDL Calculated (mg/dL)   Date Value   04/23/2022 98   03/16/2017 94   04/15/2016 76       (goal LDL is <100)   AST (U/L)   Date Value   04/23/2022 13     ALT (U/L)   Date Value   04/23/2022 11     BUN (mg/dL)   Date Value   04/23/2022 16     BP Readings from Last 3 Encounters:   02/09/24 118/76   08/09/23 112/60   02/08/23 114/72          ([id number])    History reviewed. No pertinent past medical history.   Past Surgical History:   Procedure Laterality Date    HIP SURGERY Right 1/5/2016    [facility]/Dr. Biggs    TONSILLECTOMY         History reviewed. No pertinent family history.

## 2024-03-09 ENCOUNTER — OFFICE VISIT (OUTPATIENT)
Dept: FAMILY MEDICINE CLINIC | Age: 44
End: 2024-03-09
Payer: COMMERCIAL

## 2024-03-09 VITALS
OXYGEN SATURATION: 97 % | DIASTOLIC BLOOD PRESSURE: 80 MMHG | TEMPERATURE: 99 F | SYSTOLIC BLOOD PRESSURE: 118 MMHG | HEART RATE: 87 BPM

## 2024-03-09 DIAGNOSIS — J32.9 SINUSITIS, UNSPECIFIED CHRONICITY, UNSPECIFIED LOCATION: Primary | ICD-10-CM

## 2024-03-09 PROCEDURE — 99213 OFFICE O/P EST LOW 20 MIN: CPT | Performed by: NURSE PRACTITIONER

## 2024-03-09 RX ORDER — BENZONATATE 200 MG/1
200 CAPSULE ORAL 3 TIMES DAILY PRN
Qty: 30 CAPSULE | Refills: 0 | Status: SHIPPED | OUTPATIENT
Start: 2024-03-09 | End: 2024-03-19

## 2024-03-09 RX ORDER — DOXYCYCLINE HYCLATE 100 MG
100 TABLET ORAL 2 TIMES DAILY
Qty: 20 TABLET | Refills: 0 | Status: SHIPPED | OUTPATIENT
Start: 2024-03-09 | End: 2024-03-19

## 2024-03-09 ASSESSMENT — ENCOUNTER SYMPTOMS
RHINORRHEA: 1
EYE DISCHARGE: 0
VOICE CHANGE: 0
FACIAL SWELLING: 0
TROUBLE SWALLOWING: 0
SHORTNESS OF BREATH: 0
WHEEZING: 0
SINUS PAIN: 0
SINUS PRESSURE: 1
SORE THROAT: 1
ABDOMINAL PAIN: 0
COUGH: 1

## 2024-03-09 NOTE — PROGRESS NOTES
[facility] Walk-in  [address]  [address]  [facility] [address]    Dalia Mark is a 43 y.o. female who presents today for her medical conditions/complaints of Congestion, Cough (X 2 weeks, was dry now productive with green mucus), and Ear Fullness (Right ear)          HPI:     /80   Pulse 87   Temp 99 °F (37.2 °C)   SpO2 97%       Cough  This is a new problem. The current episode started 1 to 4 weeks ago. The problem has been gradually worsening. The problem occurs constantly. The cough is Productive of sputum. Associated symptoms include chills, ear congestion, headaches, postnasal drip, rhinorrhea and a sore throat. Pertinent negatives include no chest pain, ear pain, fever, shortness of breath or wheezing. Nothing aggravates the symptoms. Treatments tried: OTC cold/flu medications. The treatment provided mild relief.   Ear Fullness   Associated symptoms include coughing, headaches, rhinorrhea and a sore throat. Pertinent negatives include no abdominal pain or ear discharge.       History reviewed. No pertinent past medical history.     Past Surgical History:   Procedure Laterality Date    HIP SURGERY Right 1/5/2016    [facility]/Dr. Biggs    TONSILLECTOMY         History reviewed. No pertinent family history.    Social History     Tobacco Use    Smoking status: Never    Smokeless tobacco: Never   Substance Use Topics    Alcohol use: Yes     Comment: rare        Prior to Visit Medications    Medication Sig Taking? Authorizing Provider   doxycycline hyclate (VIBRA-TABS) 100 MG tablet Take 1 tablet by mouth 2 times daily for 10 days Yes Ariadna Dejesus APRN - CNP   benzonatate (TESSALON) 200 MG capsule Take 1 capsule by mouth 3 times daily as needed for Cough Yes Ariadna Dejesus APRN - CNP   hydrocortisone (ANUSOL-HC) 2.5 % CREA rectal cream Place rectally 2 times daily Yes Jannie Javed APRN - CNP   Phentermine-Topiramate (QSYMIA) 11.25-69 MG CP24 Take 1 capsule by mouth

## 2024-08-09 ENCOUNTER — OFFICE VISIT (OUTPATIENT)
Dept: PRIMARY CARE CLINIC | Age: 44
End: 2024-08-09
Payer: COMMERCIAL

## 2024-08-09 VITALS
DIASTOLIC BLOOD PRESSURE: 68 MMHG | OXYGEN SATURATION: 95 % | SYSTOLIC BLOOD PRESSURE: 116 MMHG | RESPIRATION RATE: 15 BRPM | WEIGHT: 163 LBS | HEIGHT: 71 IN | HEART RATE: 71 BPM | BODY MASS INDEX: 22.82 KG/M2

## 2024-08-09 DIAGNOSIS — Z12.31 VISIT FOR SCREENING MAMMOGRAM: ICD-10-CM

## 2024-08-09 DIAGNOSIS — Z12.11 ENCOUNTER FOR SCREENING COLONOSCOPY: ICD-10-CM

## 2024-08-09 DIAGNOSIS — E66.9 CLASS 1 OBESITY WITHOUT SERIOUS COMORBIDITY IN ADULT, UNSPECIFIED BMI, UNSPECIFIED OBESITY TYPE: ICD-10-CM

## 2024-08-09 DIAGNOSIS — K21.9 GASTROESOPHAGEAL REFLUX DISEASE WITHOUT ESOPHAGITIS: Primary | ICD-10-CM

## 2024-08-09 PROCEDURE — 99214 OFFICE O/P EST MOD 30 MIN: CPT | Performed by: NURSE PRACTITIONER

## 2024-08-09 RX ORDER — PHENTERMINE AND TOPIRAMATE 15; 92 MG/1; MG/1
1 CAPSULE, EXTENDED RELEASE ORAL DAILY
Qty: 30 CAPSULE | Refills: 5 | Status: SHIPPED | OUTPATIENT
Start: 2024-08-09 | End: 2024-09-08

## 2024-08-09 RX ORDER — OMEPRAZOLE 40 MG/1
40 CAPSULE, DELAYED RELEASE ORAL DAILY
Qty: 30 CAPSULE | Refills: 2 | Status: SHIPPED | OUTPATIENT
Start: 2024-08-09 | End: 2024-09-08

## 2024-08-09 RX ORDER — UBROGEPANT 50 MG/1
TABLET ORAL
Qty: 10 TABLET | Refills: 11 | Status: SHIPPED | OUTPATIENT
Start: 2024-08-09

## 2024-08-09 SDOH — ECONOMIC STABILITY: FOOD INSECURITY: WITHIN THE PAST 12 MONTHS, YOU WORRIED THAT YOUR FOOD WOULD RUN OUT BEFORE YOU GOT MONEY TO BUY MORE.: NEVER TRUE

## 2024-08-09 SDOH — ECONOMIC STABILITY: FOOD INSECURITY: WITHIN THE PAST 12 MONTHS, THE FOOD YOU BOUGHT JUST DIDN'T LAST AND YOU DIDN'T HAVE MONEY TO GET MORE.: NEVER TRUE

## 2024-08-09 SDOH — ECONOMIC STABILITY: INCOME INSECURITY: HOW HARD IS IT FOR YOU TO PAY FOR THE VERY BASICS LIKE FOOD, HOUSING, MEDICAL CARE, AND HEATING?: NOT HARD AT ALL

## 2024-08-09 ASSESSMENT — ENCOUNTER SYMPTOMS
SHORTNESS OF BREATH: 0
BACK PAIN: 0
COUGH: 0
ABDOMINAL PAIN: 0

## 2024-08-09 ASSESSMENT — PATIENT HEALTH QUESTIONNAIRE - PHQ9
SUM OF ALL RESPONSES TO PHQ9 QUESTIONS 1 & 2: 0
SUM OF ALL RESPONSES TO PHQ QUESTIONS 1-9: 0
2. FEELING DOWN, DEPRESSED OR HOPELESS: NOT AT ALL
SUM OF ALL RESPONSES TO PHQ QUESTIONS 1-9: 0
SUM OF ALL RESPONSES TO PHQ QUESTIONS 1-9: 0
1. LITTLE INTEREST OR PLEASURE IN DOING THINGS: NOT AT ALL
SUM OF ALL RESPONSES TO PHQ QUESTIONS 1-9: 0

## 2024-08-09 NOTE — PROGRESS NOTES
Psychiatric/Behavioral:  The patient is not nervous/anxious.        Objective:     Physical Exam  Vitals and nursing note reviewed.   Constitutional:       Appearance: She is well-developed.   HENT:      Head: Normocephalic and atraumatic.   Eyes:      Pupils: Pupils are equal, round, and reactive to light.   Cardiovascular:      Rate and Rhythm: Normal rate and regular rhythm.      Heart sounds: Normal heart sounds.   Pulmonary:      Effort: Pulmonary effort is normal.      Breath sounds: Normal breath sounds.   Abdominal:      General: Bowel sounds are normal.      Palpations: Abdomen is soft.      Tenderness: There is no abdominal tenderness.   Musculoskeletal:         General: Normal range of motion.      Cervical back: Normal range of motion and neck supple.   Skin:     General: Skin is warm and dry.   Neurological:      Mental Status: She is alert and oriented to person, place, and time.   Psychiatric:         Behavior: Behavior normal.         Thought Content: Thought content normal.         Judgment: Judgment normal.       /68   Pulse 71   Resp 15   Ht 1.791 m (5' 10.5\")   Wt 73.9 kg (163 lb)   SpO2 95%   BMI 23.06 kg/m²     Assessment:       Diagnosis Orders   1. Gastroesophageal reflux disease without esophagitis        2. Visit for screening mammogram  DAVID DIGITAL SCREEN W OR WO CAD BILATERAL      3. Encounter for screening colonoscopy  Ramos Mike MD, Colorectal Surgery, [facility] COLONOSCOPY      4. Class 1 obesity without serious comorbidity in adult, unspecified BMI, unspecified obesity type  Phentermine-Topiramate (QSYMIA) 15-92 MG CP24                Plan:   Assessment & Plan   Return in about 6 months (around 2/9/2025) for annual exam.    Chronic conditions- Stable. Continue diet/exercise. Continue current meds. Rx given for mammogram and referral for colonoscopy. Follow up in six months for annual/earlier if needed.    Orders Placed This Encounter   Procedures

## 2024-08-28 ENCOUNTER — TELEPHONE (OUTPATIENT)
Dept: SURGERY | Age: 44
End: 2024-08-28

## 2024-09-05 ENCOUNTER — HOSPITAL ENCOUNTER (OUTPATIENT)
Dept: MAMMOGRAPHY | Age: 44
Discharge: HOME | End: 2024-09-07
Payer: COMMERCIAL

## 2024-09-05 DIAGNOSIS — Z12.31 VISIT FOR SCREENING MAMMOGRAM: ICD-10-CM

## 2024-09-05 PROCEDURE — 77063 BREAST TOMOSYNTHESIS BI: CPT

## 2024-09-13 ENCOUNTER — TELEPHONE (OUTPATIENT)
Dept: SURGERY | Age: 44
End: 2024-09-13

## 2025-02-10 ENCOUNTER — OFFICE VISIT (OUTPATIENT)
Dept: PRIMARY CARE CLINIC | Age: 45
End: 2025-02-10
Payer: COMMERCIAL

## 2025-02-10 VITALS
OXYGEN SATURATION: 97 % | HEIGHT: 71 IN | HEART RATE: 77 BPM | DIASTOLIC BLOOD PRESSURE: 68 MMHG | WEIGHT: 158.4 LBS | SYSTOLIC BLOOD PRESSURE: 104 MMHG | RESPIRATION RATE: 14 BRPM | BODY MASS INDEX: 22.18 KG/M2

## 2025-02-10 DIAGNOSIS — Z00.00 ENCOUNTER FOR GENERAL ADULT MEDICAL EXAMINATION W/O ABNORMAL FINDINGS: Primary | ICD-10-CM

## 2025-02-10 DIAGNOSIS — Z13.220 SCREENING FOR LIPID DISORDERS: ICD-10-CM

## 2025-02-10 DIAGNOSIS — R23.2 HOT FLASHES: ICD-10-CM

## 2025-02-10 DIAGNOSIS — Z13.29 SCREENING FOR THYROID DISORDER: ICD-10-CM

## 2025-02-10 DIAGNOSIS — K21.9 GASTROESOPHAGEAL REFLUX DISEASE WITHOUT ESOPHAGITIS: ICD-10-CM

## 2025-02-10 DIAGNOSIS — Z13.0 SCREENING FOR DEFICIENCY ANEMIA: ICD-10-CM

## 2025-02-10 DIAGNOSIS — Z13.1 SCREENING FOR DIABETES MELLITUS: ICD-10-CM

## 2025-02-10 DIAGNOSIS — R14.0 ABDOMINAL DISTENTION: ICD-10-CM

## 2025-02-10 PROCEDURE — 99396 PREV VISIT EST AGE 40-64: CPT | Performed by: NURSE PRACTITIONER

## 2025-02-10 SDOH — ECONOMIC STABILITY: FOOD INSECURITY: WITHIN THE PAST 12 MONTHS, THE FOOD YOU BOUGHT JUST DIDN'T LAST AND YOU DIDN'T HAVE MONEY TO GET MORE.: NEVER TRUE

## 2025-02-10 SDOH — ECONOMIC STABILITY: FOOD INSECURITY: WITHIN THE PAST 12 MONTHS, YOU WORRIED THAT YOUR FOOD WOULD RUN OUT BEFORE YOU GOT MONEY TO BUY MORE.: NEVER TRUE

## 2025-02-10 ASSESSMENT — ENCOUNTER SYMPTOMS
BACK PAIN: 0
SHORTNESS OF BREATH: 0
ABDOMINAL PAIN: 0
COUGH: 0

## 2025-02-10 ASSESSMENT — PATIENT HEALTH QUESTIONNAIRE - PHQ9
SUM OF ALL RESPONSES TO PHQ QUESTIONS 1-9: 0
SUM OF ALL RESPONSES TO PHQ QUESTIONS 1-9: 0
2. FEELING DOWN, DEPRESSED OR HOPELESS: NOT AT ALL
SUM OF ALL RESPONSES TO PHQ QUESTIONS 1-9: 0
SUM OF ALL RESPONSES TO PHQ9 QUESTIONS 1 & 2: 0
1. LITTLE INTEREST OR PLEASURE IN DOING THINGS: NOT AT ALL
SUM OF ALL RESPONSES TO PHQ QUESTIONS 1-9: 0

## 2025-02-10 NOTE — PROGRESS NOTES
Musculoskeletal:  Negative for back pain.   Neurological:  Negative for dizziness and numbness.   Psychiatric/Behavioral:  Positive for sleep disturbance. Negative for self-injury and suicidal ideas. The patient is not nervous/anxious.        Objective:     Physical Exam  Vitals and nursing note reviewed.   Constitutional:       Appearance: She is well-developed.   HENT:      Head: Normocephalic and atraumatic.   Eyes:      Pupils: Pupils are equal, round, and reactive to light.   Cardiovascular:      Rate and Rhythm: Normal rate and regular rhythm.      Heart sounds: Normal heart sounds.   Pulmonary:      Effort: Pulmonary effort is normal.      Breath sounds: Normal breath sounds.   Abdominal:      General: Bowel sounds are normal.      Palpations: Abdomen is soft.      Tenderness: There is no abdominal tenderness.   Musculoskeletal:         General: Normal range of motion.      Cervical back: Normal range of motion and neck supple.   Skin:     General: Skin is warm and dry.   Neurological:      Mental Status: She is alert and oriented to person, place, and time.   Psychiatric:         Behavior: Behavior normal.         Thought Content: Thought content normal.         Judgment: Judgment normal.       /68   Pulse 77   Resp 14   Ht 1.791 m (5' 10.5\")   Wt 71.8 kg (158 lb 6.4 oz)   SpO2 97%   BMI 22.41 kg/m²     Assessment:       Diagnosis Orders   1. Encounter for general adult medical examination w/o abnormal findings        2. Screening for deficiency anemia  CBC      3. Screening for diabetes mellitus  Comprehensive Metabolic Panel    Hemoglobin A1C      4. Screening for lipid disorders  Lipid Panel      5. Screening for thyroid disorder  TSH reflex to FT4      6. Hot flashes  Follicle Stimulating Hormone    Luteinizing Hormone    Estrogens, Fractionated                Plan:   Assessment & Plan   Return in about 6 months (around 8/10/2025) for med check.    HM- Continue diet/exercise and current

## 2025-02-17 ENCOUNTER — HOSPITAL ENCOUNTER (OUTPATIENT)
Age: 45
Setting detail: SPECIMEN
Discharge: HOME | End: 2025-02-17

## 2025-02-17 DIAGNOSIS — Z13.29 SCREENING FOR THYROID DISORDER: ICD-10-CM

## 2025-02-17 DIAGNOSIS — Z13.1 SCREENING FOR DIABETES MELLITUS: ICD-10-CM

## 2025-02-17 DIAGNOSIS — Z13.220 SCREENING FOR LIPID DISORDERS: ICD-10-CM

## 2025-02-17 DIAGNOSIS — R23.2 HOT FLASHES: ICD-10-CM

## 2025-02-17 DIAGNOSIS — Z13.0 SCREENING FOR DEFICIENCY ANEMIA: ICD-10-CM

## 2025-02-17 LAB
ALBUMIN SERPL-MCNC: 4.5 G/DL (ref 3.5–5.2)
ALBUMIN/GLOB SERPL: 1.9 {RATIO} (ref 1–2.5)
ALP SERPL-CCNC: 61 U/L (ref 35–104)
ALT SERPL-CCNC: 11 U/L (ref 10–35)
ANION GAP SERPL CALCULATED.3IONS-SCNC: 10 MMOL/L (ref 9–16)
AST SERPL-CCNC: 14 U/L (ref 10–35)
BILIRUB SERPL-MCNC: 0.7 MG/DL (ref 0–1.2)
BUN SERPL-MCNC: 16 MG/DL (ref 6–20)
CALCIUM SERPL-MCNC: 9.2 MG/DL (ref 8.6–10.4)
CHLORIDE SERPL-SCNC: 106 MMOL/L (ref 98–107)
CHOLEST SERPL-MCNC: 145 MG/DL (ref 0–199)
CHOLESTEROL/HDL RATIO: 2.3
CO2 SERPL-SCNC: 25 MMOL/L (ref 20–31)
CREAT SERPL-MCNC: 0.9 MG/DL (ref 0.6–0.9)
ERYTHROCYTE [DISTWIDTH] IN BLOOD BY AUTOMATED COUNT: 12.9 % (ref 11.8–14.4)
EST. AVERAGE GLUCOSE BLD GHB EST-MCNC: 91 MG/DL
FSH SERPL-ACNC: 4.4 MIU/ML
GFR, ESTIMATED: 80 ML/MIN/1.73M2
GLUCOSE SERPL-MCNC: 90 MG/DL (ref 74–99)
HBA1C MFR BLD: 4.8 % (ref 4–6)
HCT VFR BLD AUTO: 38.1 % (ref 36.3–47.1)
HDLC SERPL-MCNC: 62 MG/DL
HGB BLD-MCNC: 12.6 G/DL (ref 11.9–15.1)
LDLC SERPL CALC-MCNC: 73 MG/DL (ref 0–100)
LH SERPL-ACNC: 24 MIU/ML (ref 1.7–8.6)
MCH RBC QN AUTO: 30.3 PG (ref 25.2–33.5)
MCHC RBC AUTO-ENTMCNC: 33.1 G/DL (ref 28.4–34.8)
MCV RBC AUTO: 91.6 FL (ref 82.6–102.9)
NRBC BLD-RTO: 0 PER 100 WBC
PLATELET # BLD AUTO: 236 K/UL (ref 138–453)
PMV BLD AUTO: 11 FL (ref 8.1–13.5)
POTASSIUM SERPL-SCNC: 4.4 MMOL/L (ref 3.7–5.3)
PROT SERPL-MCNC: 6.9 G/DL (ref 6.6–8.7)
RBC # BLD AUTO: 4.16 M/UL (ref 3.95–5.11)
SODIUM SERPL-SCNC: 141 MMOL/L (ref 136–145)
TRIGL SERPL-MCNC: 49 MG/DL
TSH SERPL DL<=0.05 MIU/L-ACNC: 1.63 UIU/ML (ref 0.27–4.2)
VLDLC SERPL CALC-MCNC: 10 MG/DL (ref 1–30)
WBC OTHER # BLD: 6 K/UL (ref 3.5–11.3)

## 2025-02-20 LAB
ESTRADIOL LEVEL: 179.3 PG/ML
ESTROGEN TOTAL: 278.8 PG/ML
ESTRONE SERPL-MCNC: 99.5 PG/ML

## 2025-04-09 ENCOUNTER — PATIENT MESSAGE (OUTPATIENT)
Dept: PRIMARY CARE CLINIC | Age: 45
End: 2025-04-09

## 2025-04-09 DIAGNOSIS — E66.811 CLASS 1 OBESITY WITHOUT SERIOUS COMORBIDITY IN ADULT, UNSPECIFIED BMI, UNSPECIFIED OBESITY TYPE: ICD-10-CM

## 2025-04-09 RX ORDER — PHENTERMINE AND TOPIRAMATE 15; 92 MG/1; MG/1
1 CAPSULE, EXTENDED RELEASE ORAL DAILY
Qty: 30 CAPSULE | Refills: 5 | Status: SHIPPED | OUTPATIENT
Start: 2025-04-09 | End: 2025-05-09

## 2025-04-14 ENCOUNTER — OFFICE VISIT (OUTPATIENT)
Dept: GASTROENTEROLOGY | Age: 45
End: 2025-04-14
Payer: COMMERCIAL

## 2025-04-14 VITALS
HEART RATE: 72 BPM | TEMPERATURE: 98.3 F | BODY MASS INDEX: 23.66 KG/M2 | HEIGHT: 71 IN | SYSTOLIC BLOOD PRESSURE: 118 MMHG | WEIGHT: 169 LBS | DIASTOLIC BLOOD PRESSURE: 74 MMHG | RESPIRATION RATE: 16 BRPM | OXYGEN SATURATION: 98 %

## 2025-04-14 DIAGNOSIS — K21.9 GASTROESOPHAGEAL REFLUX DISEASE, UNSPECIFIED WHETHER ESOPHAGITIS PRESENT: Primary | ICD-10-CM

## 2025-04-14 DIAGNOSIS — R14.0 ABDOMINAL BLOATING: ICD-10-CM

## 2025-04-14 DIAGNOSIS — Z12.11 SCREENING FOR MALIGNANT NEOPLASM OF COLON: ICD-10-CM

## 2025-04-14 DIAGNOSIS — K59.00 CONSTIPATION, UNSPECIFIED CONSTIPATION TYPE: ICD-10-CM

## 2025-04-14 PROCEDURE — 99204 OFFICE O/P NEW MOD 45 MIN: CPT | Performed by: INTERNAL MEDICINE

## 2025-04-14 RX ORDER — PLECANATIDE 3 MG/1
3 TABLET ORAL DAILY
Qty: 30 TABLET | Refills: 5 | Status: SHIPPED | OUTPATIENT
Start: 2025-04-14

## 2025-04-14 RX ORDER — OMEPRAZOLE 40 MG/1
40 CAPSULE, DELAYED RELEASE ORAL DAILY
Qty: 30 CAPSULE | Refills: 0 | Status: SHIPPED | OUTPATIENT
Start: 2025-04-14

## 2025-04-14 ASSESSMENT — ENCOUNTER SYMPTOMS
WHEEZING: 0
CONSTIPATION: 0
ANAL BLEEDING: 0
VOICE CHANGE: 0
COUGH: 0
ABDOMINAL PAIN: 1
VOMITING: 0
SORE THROAT: 0
ABDOMINAL DISTENTION: 1
RECTAL PAIN: 0
BLOOD IN STOOL: 0
NAUSEA: 0
TROUBLE SWALLOWING: 0
CHOKING: 0
DIARRHEA: 0

## 2025-04-14 NOTE — PROGRESS NOTES
Reason for Referral:   JoseJannie garcia, APRN - CNP  [address]  [address]  [place] [address]    Chief Complaint   Patient presents with    New Patient     Referred for GERD and Abdominal distention.     GI Problem     Complaints of lower abdominal bloating and pain. States that she has bowel movement every 3 days but doesn't think she is constipated       HISTORY OF PRESENT ILLNESS: Ms.Jessica Mark is a 44 y.o. female , referred for evaluation of GERD, bloating.    This is a new patient referred to us for GERD, potential food intolerance? and abdominal distention. Started on omeprazole 40mg QD for her heartburn with adequate control of her symptoms. Ran out and stopped taking it for a while and plans to restart. Also tried elimination diet (dairy, gluten, etc) without any improvement in symptoms.     Reports slow bowels - goes every 3-4 days and feels \"full\" of stool. [place] 4. Cannot tolerate fiber supplements. No significant improvement with probiotics or miralax.     Denies fever/chills, appetite loss, unintentional weight loss, dysphagia/odynophagia, n/v, diarrhea, black or bloody stools.     She is prescribed Qsymia for wt loss    No prior colonoscopy  No recent abdominal imaging  Labs showing normal TSH, no anemia, normal LFTs  No abdominal surgeries in the past  No FHx colon cancer      Past Medical,Family, and Social History reviewed and does contribute to the patient presentingcondition.    I did review all the labs results available for the labs which were ordered by the primary care physician, and the other consultants, we search on [facility] at [facility] and all the available care everywhere [facility]    I did review all the imaging studies of the abdomen available on EMR, ordered by the primary care physician and the other consultant    I did review all the pathology from the biopsies done on the previous endoscopies        Patient's PMH/PSH,SH,PSYCH Hx, MEDs, ALLERGIES, and ROS

## 2025-04-14 NOTE — TELEPHONE ENCOUNTER
Last OV:  2/10/2025    Next OV: 8/11/2025    Pharmacy:       [id number] [place] [facility] [address] - [place] [phone] - F [phone]  [address]  [facility] [address]  Phone: [phone] Fax: [phone]       Confirmed? Yes

## 2025-05-08 ENCOUNTER — RESULTS FOLLOW-UP (OUTPATIENT)
Dept: GASTROENTEROLOGY | Age: 45
End: 2025-05-08

## 2025-05-08 ENCOUNTER — HOSPITAL ENCOUNTER (OUTPATIENT)
Dept: CT IMAGING | Age: 45
Discharge: HOME | End: 2025-05-10
Payer: COMMERCIAL

## 2025-05-08 DIAGNOSIS — K76.9 LIVER LESION: Primary | ICD-10-CM

## 2025-05-08 DIAGNOSIS — K21.9 GASTROESOPHAGEAL REFLUX DISEASE, UNSPECIFIED WHETHER ESOPHAGITIS PRESENT: ICD-10-CM

## 2025-05-08 DIAGNOSIS — R14.0 ABDOMINAL BLOATING: ICD-10-CM

## 2025-05-08 DIAGNOSIS — R93.2 ABNORMAL CT OF LIVER: ICD-10-CM

## 2025-05-08 PROCEDURE — 74177 CT ABD & PELVIS W/CONTRAST: CPT

## 2025-05-08 PROCEDURE — 6360000004 HC RX CONTRAST MEDICATION: Performed by: NURSE PRACTITIONER

## 2025-05-08 PROCEDURE — 2500000003 HC RX 250 WO HCPCS: Performed by: NURSE PRACTITIONER

## 2025-05-08 PROCEDURE — 2580000003 HC RX 258: Performed by: NURSE PRACTITIONER

## 2025-05-08 RX ORDER — SODIUM CHLORIDE 0.9 % (FLUSH) 0.9 %
10 SYRINGE (ML) INJECTION PRN
Status: DISCONTINUED | OUTPATIENT
Start: 2025-05-08 | End: 2025-05-11 | Stop reason: HOSPADM

## 2025-05-08 RX ORDER — IOPAMIDOL 755 MG/ML
75 INJECTION, SOLUTION INTRAVASCULAR
Status: COMPLETED | OUTPATIENT
Start: 2025-05-08 | End: 2025-05-08

## 2025-05-08 RX ORDER — 0.9 % SODIUM CHLORIDE 0.9 %
80 INTRAVENOUS SOLUTION INTRAVENOUS ONCE
Status: COMPLETED | OUTPATIENT
Start: 2025-05-08 | End: 2025-05-08

## 2025-05-08 RX ADMIN — SODIUM CHLORIDE 80 ML: 9 INJECTION, SOLUTION INTRAVENOUS at 07:56

## 2025-05-08 RX ADMIN — SODIUM CHLORIDE, PRESERVATIVE FREE 10 ML: 5 INJECTION INTRAVENOUS at 07:56

## 2025-05-08 RX ADMIN — IOPAMIDOL 75 ML: 755 INJECTION, SOLUTION INTRAVENOUS at 07:56

## 2025-05-12 ENCOUNTER — OFFICE VISIT (OUTPATIENT)
Dept: FAMILY MEDICINE CLINIC | Age: 45
End: 2025-05-12
Payer: COMMERCIAL

## 2025-05-12 VITALS
HEIGHT: 72 IN | BODY MASS INDEX: 21.94 KG/M2 | SYSTOLIC BLOOD PRESSURE: 112 MMHG | HEART RATE: 79 BPM | DIASTOLIC BLOOD PRESSURE: 82 MMHG | OXYGEN SATURATION: 99 % | WEIGHT: 162 LBS

## 2025-05-12 DIAGNOSIS — H93.8X2 EAR FULLNESS, LEFT: Primary | ICD-10-CM

## 2025-05-12 DIAGNOSIS — H91.92 DECREASED HEARING, LEFT: ICD-10-CM

## 2025-05-12 DIAGNOSIS — H61.22 IMPACTED CERUMEN OF LEFT EAR: ICD-10-CM

## 2025-05-12 PROCEDURE — 99212 OFFICE O/P EST SF 10 MIN: CPT | Performed by: NURSE PRACTITIONER

## 2025-05-12 PROCEDURE — 69209 REMOVE IMPACTED EAR WAX UNI: CPT | Performed by: NURSE PRACTITIONER

## 2025-05-12 ASSESSMENT — ENCOUNTER SYMPTOMS
COUGH: 0
SORE THROAT: 0
RHINORRHEA: 0
VOMITING: 0

## 2025-05-12 NOTE — PROGRESS NOTES
[facility] PHYSICIANS [facility] WALK-IN  [address]  [address]  [facility] [facility] [address]  Dept: [phone]     Dalia Mark is a 44 y.o. female Established patient, who presents to the walk-in clinic today with conditions/complaints as noted below:    Chief Complaint   Patient presents with    Ear Fullness     X 1 week, used a Q-tip and ear drops made left ear feel clogged          HPI:     Ear Fullness   There is pain in the left ear. This is a new problem. The current episode started 1 to 4 weeks ago. The problem has been unchanged. There has been no fever. Associated symptoms include hearing loss (decreased left). Pertinent negatives include no coughing, ear discharge, headaches, rhinorrhea, sore throat or vomiting. Treatments tried: qtip. The treatment provided no relief.       History reviewed. No pertinent past medical history.    Current Outpatient Medications   Medication Sig Dispense Refill    Phentermine-Topiramate (QSYMIA PO) Take 15 mg by mouth daily Max Daily Amount: 15 mg      omeprazole (PRILOSEC) 40 MG delayed release capsule TAKE ONE CAPSULE BY MOUTH ONE TIME DAILY 30 capsule 0    Plecanatide (TRULANCE) 3 MG TABS Take 3 mg by mouth daily 30 tablet 5    Ubrogepant (UBRELVY) 50 MG TABS Use 50mg at onset of headache.  May repeat 2 hours later 10 tablet 11    lactobacillus (BACID) TABS Take 1 tablet by mouth daily 90 tablet 3    vitamin D (ERGOCALCIFEROL) 1.25 MG (68845 UT) CAPS capsule Take 1 capsule by mouth once a week 12 capsule 3     No current facility-administered medications for this visit.       Allergies   Allergen Reactions    Bactrim Anxiety and Other (See Comments)     Fever      Pcn [Penicillins] Rash and Other (See Comments)     fever    Sulfamethoxazole-Trimethoprim        Review of Systems:     Review of Systems   HENT:  Positive for ear pain and hearing loss (decreased left). Negative for ear discharge, rhinorrhea and sore

## 2025-05-19 ENCOUNTER — HOSPITAL ENCOUNTER (OUTPATIENT)
Dept: MRI IMAGING | Age: 45
Discharge: HOME | End: 2025-05-21
Payer: COMMERCIAL

## 2025-05-19 DIAGNOSIS — K76.9 LIVER LESION: ICD-10-CM

## 2025-05-19 DIAGNOSIS — R93.2 ABNORMAL CT OF LIVER: ICD-10-CM

## 2025-05-19 PROCEDURE — 74183 MRI ABD W/O CNTR FLWD CNTR: CPT

## 2025-05-19 PROCEDURE — 2580000003 HC RX 258: Performed by: PHYSICIAN ASSISTANT

## 2025-05-19 PROCEDURE — 2500000003 HC RX 250 WO HCPCS: Performed by: PHYSICIAN ASSISTANT

## 2025-05-19 PROCEDURE — A9579 GAD-BASE MR CONTRAST NOS,1ML: HCPCS | Performed by: PHYSICIAN ASSISTANT

## 2025-05-19 PROCEDURE — 6360000004 HC RX CONTRAST MEDICATION: Performed by: PHYSICIAN ASSISTANT

## 2025-05-19 RX ORDER — 0.9 % SODIUM CHLORIDE 0.9 %
80 INTRAVENOUS SOLUTION INTRAVENOUS ONCE
Status: COMPLETED | OUTPATIENT
Start: 2025-05-19 | End: 2025-05-19

## 2025-05-19 RX ORDER — SODIUM CHLORIDE 0.9 % (FLUSH) 0.9 %
10 SYRINGE (ML) INJECTION PRN
Status: DISCONTINUED | OUTPATIENT
Start: 2025-05-19 | End: 2025-05-22 | Stop reason: HOSPADM

## 2025-05-19 RX ADMIN — GADOTERIDOL 15 ML: 279.3 INJECTION, SOLUTION INTRAVENOUS at 14:34

## 2025-05-19 RX ADMIN — SODIUM CHLORIDE, PRESERVATIVE FREE 10 ML: 5 INJECTION INTRAVENOUS at 14:35

## 2025-05-19 RX ADMIN — SODIUM CHLORIDE 80 ML: 9 INJECTION, SOLUTION INTRAVENOUS at 14:35

## 2025-05-27 ENCOUNTER — RESULTS FOLLOW-UP (OUTPATIENT)
Dept: GASTROENTEROLOGY | Age: 45
End: 2025-05-27

## 2025-05-30 ENCOUNTER — TELEPHONE (OUTPATIENT)
Dept: GASTROENTEROLOGY | Age: 45
End: 2025-05-30

## 2025-05-30 DIAGNOSIS — K76.89 FOCAL NODULAR HYPERPLASIA OF LIVER: ICD-10-CM

## 2025-05-30 DIAGNOSIS — K76.9 LIVER LESION: Primary | ICD-10-CM

## 2025-05-30 NOTE — TELEPHONE ENCOUNTER
Returned patient call regarding [facility]. Questions answered. Repeat MRI order placed for 3-6 months from now to document stability.     She also wants to notify the staff that she has stopped the trulance - either would not work at all, or would cause severe, urgent diarrhea. Fiber previously tried via foods causing significant bloating and stomachaches. No significant improvement in bowel consistency with miralax. Recommend re-introducing fiber (low doses, increase slowly) and restart probiotics. Notify if still no changes. If no improvement, try Amitiza.    Electronically signed by Guerline Fair PA-C on 5/30/2025 at 1:17 PM

## 2025-06-14 RX ORDER — OMEPRAZOLE 40 MG/1
40 CAPSULE, DELAYED RELEASE ORAL DAILY
Qty: 30 CAPSULE | Refills: 0 | Status: SHIPPED | OUTPATIENT
Start: 2025-06-14

## 2025-08-04 RX ORDER — OMEPRAZOLE 40 MG/1
40 CAPSULE, DELAYED RELEASE ORAL DAILY
Qty: 30 CAPSULE | Refills: 0 | Status: SHIPPED | OUTPATIENT
Start: 2025-08-04

## 2025-08-11 ENCOUNTER — OFFICE VISIT (OUTPATIENT)
Dept: PRIMARY CARE CLINIC | Age: 45
End: 2025-08-11
Payer: COMMERCIAL

## 2025-08-11 ENCOUNTER — HOSPITAL ENCOUNTER (OUTPATIENT)
Age: 45
Setting detail: SPECIMEN
Discharge: HOME | End: 2025-08-11

## 2025-08-11 VITALS
DIASTOLIC BLOOD PRESSURE: 64 MMHG | HEIGHT: 71 IN | BODY MASS INDEX: 23.27 KG/M2 | WEIGHT: 166.2 LBS | OXYGEN SATURATION: 100 % | HEART RATE: 69 BPM | RESPIRATION RATE: 14 BRPM | SYSTOLIC BLOOD PRESSURE: 102 MMHG

## 2025-08-11 DIAGNOSIS — K21.9 GASTROESOPHAGEAL REFLUX DISEASE, UNSPECIFIED WHETHER ESOPHAGITIS PRESENT: ICD-10-CM

## 2025-08-11 DIAGNOSIS — E66.811 CLASS 1 OBESITY WITHOUT SERIOUS COMORBIDITY IN ADULT, UNSPECIFIED BMI, UNSPECIFIED OBESITY TYPE: ICD-10-CM

## 2025-08-11 DIAGNOSIS — Z12.83 SKIN CANCER SCREENING: ICD-10-CM

## 2025-08-11 DIAGNOSIS — K21.9 GASTROESOPHAGEAL REFLUX DISEASE WITHOUT ESOPHAGITIS: ICD-10-CM

## 2025-08-11 DIAGNOSIS — Z12.31 VISIT FOR SCREENING MAMMOGRAM: ICD-10-CM

## 2025-08-11 DIAGNOSIS — R14.0 ABDOMINAL BLOATING: ICD-10-CM

## 2025-08-11 DIAGNOSIS — R14.0 ABDOMINAL DISTENTION: Primary | ICD-10-CM

## 2025-08-11 PROCEDURE — 99214 OFFICE O/P EST MOD 30 MIN: CPT | Performed by: NURSE PRACTITIONER

## 2025-08-11 SDOH — ECONOMIC STABILITY: FOOD INSECURITY: WITHIN THE PAST 12 MONTHS, YOU WORRIED THAT YOUR FOOD WOULD RUN OUT BEFORE YOU GOT MONEY TO BUY MORE.: NEVER TRUE

## 2025-08-11 SDOH — ECONOMIC STABILITY: FOOD INSECURITY: WITHIN THE PAST 12 MONTHS, THE FOOD YOU BOUGHT JUST DIDN'T LAST AND YOU DIDN'T HAVE MONEY TO GET MORE.: NEVER TRUE

## 2025-08-11 ASSESSMENT — PATIENT HEALTH QUESTIONNAIRE - PHQ9
1. LITTLE INTEREST OR PLEASURE IN DOING THINGS: NOT AT ALL
SUM OF ALL RESPONSES TO PHQ QUESTIONS 1-9: 0
2. FEELING DOWN, DEPRESSED OR HOPELESS: NOT AT ALL
SUM OF ALL RESPONSES TO PHQ QUESTIONS 1-9: 0

## 2025-08-11 ASSESSMENT — ENCOUNTER SYMPTOMS
COUGH: 0
SHORTNESS OF BREATH: 0
BACK PAIN: 0
ABDOMINAL PAIN: 1

## 2025-08-12 LAB
GLIADIN IGA SER IA-ACNC: 1.1 U/ML
GLIADIN IGG SER IA-ACNC: <0.4 U/ML
IGA SERPL-MCNC: 116 MG/DL (ref 70–400)
TTG IGA SER IA-ACNC: <0.1 U/ML

## 2025-08-15 LAB
BARLEY IGE QN: 0.46 KU/L (ref 0–0.34)
BEEF IGE QN: <0.1 KU/L (ref 0–0.34)
CABBAGE IGE QN: <0.1 KU/L (ref 0–0.34)
CARROT IGE QN: <0.1 KU/L (ref 0–0.34)
CHICKEN MEAT IGE QN: <0.1 KU/L (ref 0–0.34)
CODFISH IGE QN: <0.1 KU/L (ref 0–0.34)
CORN IGE QN: <0.1 KU/L (ref 0–0.34)
COW MILK IGE QN: <0.1 KU/L (ref 0–0.34)
CRAB IGE QN: <0.1 KU/L (ref 0–0.34)
EGG WHITE IGE QN: <0.1 KU/L (ref 0–0.34)
GRAPE IGE QN: <0.1 KU/L (ref 0–0.34)
IGE SERPL-ACNC: 45 IU/ML (ref 0–100)
LETTUCE IGE QN: <0.1 KU/L (ref 0–0.34)
OAT IGE QN: 0.2 KU/L (ref 0–0.34)
ORANGE TREE IGE QN: <0.1 KU/L (ref 0–0.34)
PAPRIKA IGE QN: 0.14 KU/L (ref 0–0.34)
PEANUT IGE QN: <0.1 KU/L (ref 0–0.34)
PORK IGE QN: <0.1 KU/L (ref 0–0.34)
POTATO IGE QN: <0.1 KU/L (ref 0–0.34)
RICE IGE QN: 0.43 KU/L (ref 0–0.34)
RYE IGE QN: 0.19 KU/L (ref 0–0.34)
SHRIMP IGE QN: <0.1 KU/L (ref 0–0.34)
SOYBEAN IGE QN: <0.1 KU/L (ref 0–0.34)
TOMATO IGE QN: <0.1 KU/L (ref 0–0.34)
TUNA IGE QN: <0.1 KU/L (ref 0–0.34)
WHEAT IGE QN: 0.43 KU/L (ref 0–0.34)
WHITE BEAN IGE QN: <0.1 KU/L (ref 0–0.34)

## 2025-08-25 RX ORDER — OMEPRAZOLE 40 MG/1
40 CAPSULE, DELAYED RELEASE ORAL DAILY
Qty: 30 CAPSULE | Refills: 0 | Status: SHIPPED | OUTPATIENT
Start: 2025-08-25